# Patient Record
Sex: MALE | Race: WHITE | NOT HISPANIC OR LATINO | Employment: FULL TIME | URBAN - METROPOLITAN AREA
[De-identification: names, ages, dates, MRNs, and addresses within clinical notes are randomized per-mention and may not be internally consistent; named-entity substitution may affect disease eponyms.]

---

## 2017-01-15 ENCOUNTER — COMMUNICATION - HEALTHEAST (OUTPATIENT)
Dept: FAMILY MEDICINE | Facility: CLINIC | Age: 22
End: 2017-01-15

## 2017-01-15 DIAGNOSIS — F41.9 ANXIETY: ICD-10-CM

## 2018-05-11 ENCOUNTER — OFFICE VISIT - HEALTHEAST (OUTPATIENT)
Dept: FAMILY MEDICINE | Facility: CLINIC | Age: 23
End: 2018-05-11

## 2018-05-11 DIAGNOSIS — F41.9 ANXIETY: ICD-10-CM

## 2018-05-11 DIAGNOSIS — R03.0 ELEVATED BLOOD PRESSURE READING WITHOUT DIAGNOSIS OF HYPERTENSION: ICD-10-CM

## 2018-05-11 LAB
25(OH)D3 SERPL-MCNC: 20.5 NG/ML (ref 30–80)
25(OH)D3 SERPL-MCNC: 20.5 NG/ML (ref 30–80)
ALBUMIN SERPL-MCNC: 3.8 G/DL (ref 3.5–5)
ALBUMIN UR-MCNC: NEGATIVE MG/DL
ALP SERPL-CCNC: 105 U/L (ref 45–120)
ALT SERPL W P-5'-P-CCNC: 29 U/L (ref 0–45)
ANION GAP SERPL CALCULATED.3IONS-SCNC: 8 MMOL/L (ref 5–18)
APPEARANCE UR: CLEAR
AST SERPL W P-5'-P-CCNC: 23 U/L (ref 0–40)
BILIRUB SERPL-MCNC: 0.5 MG/DL (ref 0–1)
BILIRUB UR QL STRIP: NEGATIVE
BUN SERPL-MCNC: 15 MG/DL (ref 8–22)
CALCIUM SERPL-MCNC: 9.9 MG/DL (ref 8.5–10.5)
CHLORIDE BLD-SCNC: 106 MMOL/L (ref 98–107)
CO2 SERPL-SCNC: 26 MMOL/L (ref 22–31)
COLOR UR AUTO: YELLOW
CREAT SERPL-MCNC: 0.98 MG/DL (ref 0.7–1.3)
ERYTHROCYTE [DISTWIDTH] IN BLOOD BY AUTOMATED COUNT: 12.5 % (ref 11–14.5)
GFR SERPL CREATININE-BSD FRML MDRD: >60 ML/MIN/1.73M2
GLUCOSE BLD-MCNC: 89 MG/DL (ref 70–125)
GLUCOSE UR STRIP-MCNC: NEGATIVE MG/DL
HCT VFR BLD AUTO: 46.1 % (ref 40–54)
HGB BLD-MCNC: 15.8 G/DL (ref 14–18)
HGB UR QL STRIP: NEGATIVE
KETONES UR STRIP-MCNC: NEGATIVE MG/DL
LEUKOCYTE ESTERASE UR QL STRIP: NEGATIVE
MCH RBC QN AUTO: 29.9 PG (ref 27–34)
MCHC RBC AUTO-ENTMCNC: 34.3 G/DL (ref 32–36)
MCV RBC AUTO: 87 FL (ref 80–100)
NITRATE UR QL: NEGATIVE
PH UR STRIP: 6.5 [PH] (ref 5–8)
PLATELET # BLD AUTO: 276 THOU/UL (ref 140–440)
PMV BLD AUTO: 7.3 FL (ref 7–10)
POTASSIUM BLD-SCNC: 4.7 MMOL/L (ref 3.5–5)
PROT SERPL-MCNC: 7.2 G/DL (ref 6–8)
RBC # BLD AUTO: 5.29 MILL/UL (ref 4.4–6.2)
SODIUM SERPL-SCNC: 140 MMOL/L (ref 136–145)
SP GR UR STRIP: 1.02 (ref 1–1.03)
TSH SERPL DL<=0.005 MIU/L-ACNC: 4.91 UIU/ML (ref 0.3–5)
UROBILINOGEN UR STRIP-ACNC: NORMAL
WBC: 6.3 THOU/UL (ref 4–11)

## 2018-09-21 ENCOUNTER — OFFICE VISIT - HEALTHEAST (OUTPATIENT)
Dept: FAMILY MEDICINE | Facility: CLINIC | Age: 23
End: 2018-09-21

## 2018-09-21 DIAGNOSIS — R41.840 DIFFICULTY CONCENTRATING: ICD-10-CM

## 2018-09-21 ASSESSMENT — MIFFLIN-ST. JEOR: SCORE: 2107.41

## 2018-09-27 ENCOUNTER — COMMUNICATION - HEALTHEAST (OUTPATIENT)
Dept: FAMILY MEDICINE | Facility: CLINIC | Age: 23
End: 2018-09-27

## 2019-03-29 ENCOUNTER — OFFICE VISIT - HEALTHEAST (OUTPATIENT)
Dept: FAMILY MEDICINE | Facility: CLINIC | Age: 24
End: 2019-03-29

## 2019-03-29 DIAGNOSIS — D17.30 LIPOMA OF SKIN AND SUBCUTANEOUS TISSUE: ICD-10-CM

## 2019-03-29 ASSESSMENT — MIFFLIN-ST. JEOR: SCORE: 2109.35

## 2019-05-07 ENCOUNTER — OFFICE VISIT - HEALTHEAST (OUTPATIENT)
Dept: FAMILY MEDICINE | Facility: CLINIC | Age: 24
End: 2019-05-07

## 2019-05-07 DIAGNOSIS — F90.0 ATTENTION DEFICIT HYPERACTIVITY DISORDER (ADHD), PREDOMINANTLY INATTENTIVE TYPE: ICD-10-CM

## 2019-05-07 DIAGNOSIS — F41.1 GENERALIZED ANXIETY DISORDER: ICD-10-CM

## 2019-05-28 ENCOUNTER — COMMUNICATION - HEALTHEAST (OUTPATIENT)
Dept: FAMILY MEDICINE | Facility: CLINIC | Age: 24
End: 2019-05-28

## 2019-05-28 DIAGNOSIS — F41.9 ANXIETY: ICD-10-CM

## 2019-09-08 ENCOUNTER — OFFICE VISIT - HEALTHEAST (OUTPATIENT)
Dept: FAMILY MEDICINE | Facility: CLINIC | Age: 24
End: 2019-09-08

## 2019-09-08 DIAGNOSIS — F41.9 ANXIETY: ICD-10-CM

## 2019-09-08 ASSESSMENT — ANXIETY QUESTIONNAIRES
GAD7 TOTAL SCORE: 21
1. FEELING NERVOUS, ANXIOUS, OR ON EDGE: NEARLY EVERY DAY
IF YOU CHECKED OFF ANY PROBLEMS ON THIS QUESTIONNAIRE, HOW DIFFICULT HAVE THESE PROBLEMS MADE IT FOR YOU TO DO YOUR WORK, TAKE CARE OF THINGS AT HOME, OR GET ALONG WITH OTHER PEOPLE: NOT DIFFICULT AT ALL
7. FEELING AFRAID AS IF SOMETHING AWFUL MIGHT HAPPEN: NEARLY EVERY DAY
2. NOT BEING ABLE TO STOP OR CONTROL WORRYING: NEARLY EVERY DAY
5. BEING SO RESTLESS THAT IT IS HARD TO SIT STILL: NEARLY EVERY DAY
4. TROUBLE RELAXING: NEARLY EVERY DAY
3. WORRYING TOO MUCH ABOUT DIFFERENT THINGS: NEARLY EVERY DAY
6. BECOMING EASILY ANNOYED OR IRRITABLE: NEARLY EVERY DAY

## 2019-09-08 ASSESSMENT — PATIENT HEALTH QUESTIONNAIRE - PHQ9: SUM OF ALL RESPONSES TO PHQ QUESTIONS 1-9: 23

## 2019-09-09 ENCOUNTER — OFFICE VISIT - HEALTHEAST (OUTPATIENT)
Dept: FAMILY MEDICINE | Facility: CLINIC | Age: 24
End: 2019-09-09

## 2019-09-09 DIAGNOSIS — F41.1 GENERALIZED ANXIETY DISORDER: ICD-10-CM

## 2019-09-09 ASSESSMENT — ANXIETY QUESTIONNAIRES
GAD7 TOTAL SCORE: 18
IF YOU CHECKED OFF ANY PROBLEMS ON THIS QUESTIONNAIRE, HOW DIFFICULT HAVE THESE PROBLEMS MADE IT FOR YOU TO DO YOUR WORK, TAKE CARE OF THINGS AT HOME, OR GET ALONG WITH OTHER PEOPLE: VERY DIFFICULT
2. NOT BEING ABLE TO STOP OR CONTROL WORRYING: NEARLY EVERY DAY
5. BEING SO RESTLESS THAT IT IS HARD TO SIT STILL: NEARLY EVERY DAY
7. FEELING AFRAID AS IF SOMETHING AWFUL MIGHT HAPPEN: NEARLY EVERY DAY
4. TROUBLE RELAXING: NEARLY EVERY DAY
3. WORRYING TOO MUCH ABOUT DIFFERENT THINGS: NEARLY EVERY DAY
6. BECOMING EASILY ANNOYED OR IRRITABLE: NOT AT ALL
1. FEELING NERVOUS, ANXIOUS, OR ON EDGE: NEARLY EVERY DAY

## 2019-09-09 ASSESSMENT — PATIENT HEALTH QUESTIONNAIRE - PHQ9: SUM OF ALL RESPONSES TO PHQ QUESTIONS 1-9: 15

## 2019-09-26 ENCOUNTER — OFFICE VISIT - HEALTHEAST (OUTPATIENT)
Dept: FAMILY MEDICINE | Facility: CLINIC | Age: 24
End: 2019-09-26

## 2019-09-26 DIAGNOSIS — F41.9 ANXIETY: ICD-10-CM

## 2019-09-26 DIAGNOSIS — Z23 NEED FOR INFLUENZA VACCINATION: ICD-10-CM

## 2019-09-26 ASSESSMENT — PATIENT HEALTH QUESTIONNAIRE - PHQ9: SUM OF ALL RESPONSES TO PHQ QUESTIONS 1-9: 9

## 2019-10-09 ENCOUNTER — COMMUNICATION - HEALTHEAST (OUTPATIENT)
Dept: SCHEDULING | Facility: CLINIC | Age: 24
End: 2019-10-09

## 2019-10-24 ENCOUNTER — OFFICE VISIT - HEALTHEAST (OUTPATIENT)
Dept: FAMILY MEDICINE | Facility: CLINIC | Age: 24
End: 2019-10-24

## 2019-10-24 DIAGNOSIS — F41.1 GENERALIZED ANXIETY DISORDER: ICD-10-CM

## 2019-10-24 DIAGNOSIS — Z13.1 SCREENING FOR DIABETES MELLITUS: ICD-10-CM

## 2019-10-24 DIAGNOSIS — Z00.00 ROUTINE MEDICAL EXAM: ICD-10-CM

## 2019-10-24 DIAGNOSIS — Z13.220 SCREENING, LIPID: ICD-10-CM

## 2019-10-24 LAB
ANION GAP SERPL CALCULATED.3IONS-SCNC: 9 MMOL/L (ref 5–18)
BUN SERPL-MCNC: 12 MG/DL (ref 8–22)
CALCIUM SERPL-MCNC: 9.8 MG/DL (ref 8.5–10.5)
CHLORIDE BLD-SCNC: 106 MMOL/L (ref 98–107)
CHOLEST SERPL-MCNC: 235 MG/DL
CO2 SERPL-SCNC: 26 MMOL/L (ref 22–31)
CREAT SERPL-MCNC: 0.95 MG/DL (ref 0.7–1.3)
FASTING STATUS PATIENT QL REPORTED: YES
GFR SERPL CREATININE-BSD FRML MDRD: >60 ML/MIN/1.73M2
GLUCOSE BLD-MCNC: 89 MG/DL (ref 70–125)
HDLC SERPL-MCNC: 46 MG/DL
LDLC SERPL CALC-MCNC: 171 MG/DL
POTASSIUM BLD-SCNC: 4.5 MMOL/L (ref 3.5–5)
SODIUM SERPL-SCNC: 141 MMOL/L (ref 136–145)
TRIGL SERPL-MCNC: 92 MG/DL

## 2019-10-24 ASSESSMENT — ANXIETY QUESTIONNAIRES
7. FEELING AFRAID AS IF SOMETHING AWFUL MIGHT HAPPEN: NOT AT ALL
2. NOT BEING ABLE TO STOP OR CONTROL WORRYING: NOT AT ALL
6. BECOMING EASILY ANNOYED OR IRRITABLE: SEVERAL DAYS
GAD7 TOTAL SCORE: 3
3. WORRYING TOO MUCH ABOUT DIFFERENT THINGS: SEVERAL DAYS
1. FEELING NERVOUS, ANXIOUS, OR ON EDGE: SEVERAL DAYS
4. TROUBLE RELAXING: NOT AT ALL
IF YOU CHECKED OFF ANY PROBLEMS ON THIS QUESTIONNAIRE, HOW DIFFICULT HAVE THESE PROBLEMS MADE IT FOR YOU TO DO YOUR WORK, TAKE CARE OF THINGS AT HOME, OR GET ALONG WITH OTHER PEOPLE: NOT DIFFICULT AT ALL
5. BEING SO RESTLESS THAT IT IS HARD TO SIT STILL: NOT AT ALL

## 2019-10-24 ASSESSMENT — PATIENT HEALTH QUESTIONNAIRE - PHQ9: SUM OF ALL RESPONSES TO PHQ QUESTIONS 1-9: 6

## 2019-10-24 ASSESSMENT — MIFFLIN-ST. JEOR: SCORE: 2149.26

## 2020-03-19 ENCOUNTER — COMMUNICATION - HEALTHEAST (OUTPATIENT)
Dept: SCHEDULING | Facility: CLINIC | Age: 25
End: 2020-03-19

## 2020-04-08 ENCOUNTER — VIRTUAL VISIT (OUTPATIENT)
Dept: FAMILY MEDICINE | Facility: OTHER | Age: 25
End: 2020-04-08

## 2020-04-08 NOTE — PROGRESS NOTES
"Date: 2020 22:24:03  Clinician: Mile Ohara  Clinician NPI: [id number]  Patient: Brian Bright  Patient : 1995  Patient Address: [address], [place] [address]  Patient Phone: [phone]  Visit Protocol: URI  Patient Summary:  Brian is a 24 year old ( : 1995 ) male who initiated a Visit for COVID-19 (Coronavirus) evaluation and screening. When asked the question \"Please sign me up to receive news, health information and promotions from [employer].\", Brian responded \"No\".    Brian states his symptoms started gradually 10-13 days ago. After his symptoms started, they improved and then got worse again.   His symptoms consist of rhinitis, facial pain or pressure, myalgia, a cough, malaise, and a headache. He is experiencing mild difficulty breathing with activities but can speak normally in full sentences. Brian also feels feverish.   Symptom details     Nasal secretions: The color of his mucus is clear.    Cough: Brian coughs every 5-10 minutes and his cough is not more bothersome at night. Phlegm does not come into his throat when he coughs. He does not believe his cough is caused by post-nasal drip.     Temperature: His current temperature is 99.6 degrees Fahrenheit.     Facial pain or pressure: The facial pain or pressure does not feel worse when bending or leaning forward.     Headache: He states the headache is moderate (4-6 on a 10 point pain scale).      Brian denies having sore throat, nasal congestion, ear pain, enlarged lymph nodes, chills, diarrhea, vomiting, nausea, teeth pain, and wheezing. He also denies taking antibiotic medication for the symptoms, having recent facial or sinus surgery in the past 60 days, and having a sinus infection within the past year.   Precipitating events  He has not recently been exposed to someone with influenza. Brian has not been in close contact with any high risk individuals.   Pertinent COVID-19 (Coronavirus) information  Brian has not traveled " internationally or to the areas where COVID-19 (Coronavirus) is widespread, including cruise ship travel in the last 14 days before the start of his symptoms.   Brian has not had a close contact with a laboratory-confirmed COVID-19 patient within 14 days of symptom onset. He also has not had a close contact with a suspected COVID-19 patient within 14 days of symptom onset.   Brian does not work or volunteer as healthcare worker or a  and does not work or volunteer in a healthcare facility. He does not live with a healthcare worker.   Pertinent medical history  Brian needs a return to work/school note.   Weight: 240 lbs   Brian does not smoke or use smokeless tobacco.   Weight: 240 lbs    MEDICATIONS: Lexapro oral, ALLERGIES: NKDA  Clinician Response:  Dear Brian,  Based on the information provided, you have acute bacterial sinusitis, also known as a sinus infection. Sinus infections are caused by bacteria or a virus and symptoms are almost always identical. The difference between the 2 types of infections is timing.  Sinus infections start as viral infections and symptoms improve on their own in about 7 days. If symptoms have not improved after 7 days or have even worsened, a bacterial infection may have developed.  Medication information  I am prescribing:     Amoxicillin 500 mg oral tablet. Take 1 tablet by mouth every 8 hours for 10 days. There are no refills with this prescription.   Unless you are allergic to the over-the-counter medication(s) below, I recommend using:     Ibuprofen (Advil or store brand) 200 mg oral tablet. Take 1-3 tablets (200-600 mg) by mouth every 8 hours to help with the discomfort. Make sure to take the ibuprofen with food. Do not exceed 2400 mg in 24 hours.   Over-the-counter medications do not require a prescription. Ask the pharmacist if you have any questions.  Self care  Steps you can take to be as comfortable as possible:     Rest.    Drink plenty of fluids.    Take a  warm shower to loosen congestion    Use a cool-mist humidifier.    Take a spoonful of honey to reduce your cough.     When to seek care  Please be seen in a clinic or urgent care if any of the following occur:     New symptoms develop, or symptoms become worse    Symptoms do not start to improve after 3 days of treatment     Call ahead before going to the clinic or urgent care.  It is possible to have an allergic reaction to an antibiotic even if you have not had one in the past. If you notice a new rash, significant swelling, or difficulty breathing, stop taking this medication immediately and go to a clinic or urgent care.  COVID-19 (Coronavirus) General Information  With the increase in the number of COVID-19 (Coronavirus) cases, we understand you may have some questions. Below is some helpful information on COVID-19 (Coronavirus).  How can I protect myself and others from the COVID-19 (Coronavirus)?  Because there is currently no vaccine to prevent infection, the best way to protect yourself is to avoid being exposed to this virus. Put distance between yourself and other people if COVID-19 (Coronavirus) is spreading in your community. The virus is thought to spread mainly from person-to-person.     Between people who are in close contact with one another (within about 6 about) for a prolonged period (10 minutes or longer).    Through respiratory droplets produced when an infected person coughs or sneezes.     The CDC recommends the following additional steps to protect yourself and others:     Wash your hands often with soap and water for at least 20 seconds, especially after blowing your nose, coughing, or sneezing; going to the bathroom; and before eating or preparing food.  Use an alcohol-based hand  that contains at least 60 percent alcohol if soap and water are not available.        Avoid touching your eyes, nose and mouth with unwashed hands.    Avoid close contact with people who are sick.     Stay home when you are sick.    Cover your cough or sneeze with a tissue, then throw the tissue in the trash.    Clean and disinfect frequently touched objects and surfaces.     You can help stop COVID-19 (Coronavirus) by knowing the signs and symptoms:     Fever    Cough    Shortness of breath     Contact your healthcare provider if   Develop symptoms   AND   Have been in close contact with a person known to have COVID-19 (Coronavirus) or live in or have recently traveled from an area with ongoing spread of COVID-19 (Coronavirus). Call ahead before you go to a doctor's office or emergency room. Tell them about your recent travel and your symptoms.   For the most up to date information, visit the CDC's website.  Self-monitoring  Self-monitoring means people should monitor themselves for fever by taking their temperatures twice a day and remain alert for a cough or difficulty breathing.  It is important to check your health two times each day for 14 days after a potential exposure to a person with COVID-19 (Coronavirus) or after travel from a location where COVID-19 (Coronavirus) is widespread. If you have been exposed to a person with COVID-19 (Coronavirus), it may take up to 14 days to know if you will get sick. Follow the steps below to check and record your health.     Take your temperature with a thermometer twice a day, once in the morning and once in the evening, and watch for a cough or difficulty breathing for 14 days.    Write down your temperature and any COVID-19 symptoms you may have: feeling feverish, coughing, or difficulty breathing.    Stay home from work or school.    Do not take public transportation, taxis, or ride-shares.    Avoid crowded places (such as shopping centers and movie theaters) and limit your activities in public.    Keep your distance from others (about 6 feet or 2 meters).    If you get sick with fever, cough, or trouble breathing, contact your healthcare provider and tell them  "about your recent travel and/or your symptoms.    If you need to seek medical care for other reasons, such as dialysis, call ahead to your doctor and tell them about your recent travel.     Steps to help prevent the spread of COVID-19 (Coronavirus) if you are sick  If you are sick with COVID-19 (Coronavirus) or suspect you are infected with the virus that causes COVID-19 (Coronavirus), follow the steps below to help prevent the disease from spreading&nbsp;to people in your home and community.     Stay home except to get medical care. Home isolation may be started in consultation with your healthcare clinician.    Separate yourself from other people and animals in your home.    Call ahead before visiting your doctor if you have a medical appointment.    Wear a facemask when you are around other people.    Cover your cough and sneezes.    Clean your hands often.    Avoid sharing personal household items.    Clean and disinfect frequently touched objects and surfaces everyday.    You will need to have someone drop off medications or household supplies (if needed) at your house without coming inside or in contact with you or others living in your house.    Monitor your symptoms and seek prompt medical care if your illness is worsening (e.g. Difficulty breathing).    Discontinue home isolation only in consultation with your healthcare provider.     For more detailed and up to date information on what to do if you are sick, visit this link: What to Do If You Are Sick With COVID-19.  Do I need to be tested for COVID-19 (Coronavirus)?     Not everyone needs to be tested for COVID-19 (Coronavirus). Decisions on which patients receive testing will be based on the local spread of COVID-19 (Coronavirus) as well as the symptoms. Your healthcare provider will make the final decision on whether you should be tested.    In the meantime, if you have concerns that you may have been exposed, it is reasonable to practice \"social " "distancing.\"&nbsp; If you are ill with a cold or flu-like illness, please monitor your symptoms and call your healthcare provider if your symptoms worsen.    For more up to date information, visit this link: COVID-19 (Coronavirus) Frequently Asked Questions and Answers.      Diagnosis: Acute bacterial sinusitis  Diagnosis ICD: J01.90  Prescription: amoxicillin 500 mg oral tablet 30 tablet, 10 days supply. Take 1 tablet by mouth every 8 hours for 10 days. Refills: 0, Refill as needed: no, Allow substitutions: yes  "

## 2020-04-10 ENCOUNTER — OFFICE VISIT - HEALTHEAST (OUTPATIENT)
Dept: FAMILY MEDICINE | Facility: CLINIC | Age: 25
End: 2020-04-10

## 2020-04-10 DIAGNOSIS — Z20.822 SUSPECTED 2019 NOVEL CORONAVIRUS INFECTION: ICD-10-CM

## 2020-04-18 ENCOUNTER — COMMUNICATION - HEALTHEAST (OUTPATIENT)
Dept: SCHEDULING | Facility: CLINIC | Age: 25
End: 2020-04-18

## 2020-04-26 ENCOUNTER — COMMUNICATION - HEALTHEAST (OUTPATIENT)
Dept: SCHEDULING | Facility: CLINIC | Age: 25
End: 2020-04-26

## 2020-04-30 ENCOUNTER — OFFICE VISIT - HEALTHEAST (OUTPATIENT)
Dept: FAMILY MEDICINE | Facility: CLINIC | Age: 25
End: 2020-04-30

## 2020-04-30 DIAGNOSIS — L03.115 CELLULITIS OF RIGHT LOWER EXTREMITY: ICD-10-CM

## 2020-04-30 DIAGNOSIS — T14.8XXA OPEN WOUND: ICD-10-CM

## 2020-04-30 DIAGNOSIS — F41.1 GAD (GENERALIZED ANXIETY DISORDER): ICD-10-CM

## 2020-04-30 ASSESSMENT — MIFFLIN-ST. JEOR: SCORE: 2162.87

## 2020-08-31 ENCOUNTER — COMMUNICATION - HEALTHEAST (OUTPATIENT)
Dept: FAMILY MEDICINE | Facility: CLINIC | Age: 25
End: 2020-08-31

## 2020-08-31 DIAGNOSIS — Z20.822 EXPOSURE TO 2019 NOVEL CORONAVIRUS: ICD-10-CM

## 2020-09-03 ENCOUNTER — AMBULATORY - HEALTHEAST (OUTPATIENT)
Dept: LAB | Facility: CLINIC | Age: 25
End: 2020-09-03

## 2020-09-03 DIAGNOSIS — Z20.822 EXPOSURE TO 2019 NOVEL CORONAVIRUS: ICD-10-CM

## 2020-09-05 ENCOUNTER — COMMUNICATION - HEALTHEAST (OUTPATIENT)
Dept: SCHEDULING | Facility: CLINIC | Age: 25
End: 2020-09-05

## 2020-10-01 ENCOUNTER — COMMUNICATION - HEALTHEAST (OUTPATIENT)
Dept: FAMILY MEDICINE | Facility: CLINIC | Age: 25
End: 2020-10-01

## 2020-10-01 DIAGNOSIS — F41.1 GENERALIZED ANXIETY DISORDER: ICD-10-CM

## 2020-10-02 RX ORDER — ESCITALOPRAM OXALATE 20 MG/1
TABLET ORAL
Qty: 90 TABLET | Refills: 3 | Status: SHIPPED | OUTPATIENT
Start: 2020-10-02 | End: 2021-10-24

## 2020-10-19 ENCOUNTER — COMMUNICATION - HEALTHEAST (OUTPATIENT)
Dept: FAMILY MEDICINE | Facility: CLINIC | Age: 25
End: 2020-10-19

## 2020-10-19 ENCOUNTER — OFFICE VISIT - HEALTHEAST (OUTPATIENT)
Dept: FAMILY MEDICINE | Facility: CLINIC | Age: 25
End: 2020-10-19

## 2020-10-19 DIAGNOSIS — M79.10 MYALGIA: ICD-10-CM

## 2020-10-19 DIAGNOSIS — R53.83 FATIGUE, UNSPECIFIED TYPE: ICD-10-CM

## 2020-10-19 DIAGNOSIS — R63.0 DECREASED APPETITE: ICD-10-CM

## 2020-10-19 DIAGNOSIS — R43.9 DECREASED TASTE AND SMELL: ICD-10-CM

## 2020-10-20 ENCOUNTER — AMBULATORY - HEALTHEAST (OUTPATIENT)
Dept: FAMILY MEDICINE | Facility: CLINIC | Age: 25
End: 2020-10-20

## 2020-10-20 DIAGNOSIS — R43.9 DECREASED TASTE AND SMELL: ICD-10-CM

## 2020-10-20 DIAGNOSIS — M79.10 MYALGIA: ICD-10-CM

## 2020-10-20 DIAGNOSIS — R63.0 DECREASED APPETITE: ICD-10-CM

## 2020-10-20 DIAGNOSIS — R53.83 FATIGUE, UNSPECIFIED TYPE: ICD-10-CM

## 2020-10-22 ENCOUNTER — COMMUNICATION - HEALTHEAST (OUTPATIENT)
Dept: SCHEDULING | Facility: CLINIC | Age: 25
End: 2020-10-22

## 2020-11-07 ENCOUNTER — COMMUNICATION - HEALTHEAST (OUTPATIENT)
Dept: FAMILY MEDICINE | Facility: CLINIC | Age: 25
End: 2020-11-07

## 2020-11-07 DIAGNOSIS — Z20.822 EXPOSURE TO 2019 NOVEL CORONAVIRUS: ICD-10-CM

## 2020-12-17 ENCOUNTER — OFFICE VISIT - HEALTHEAST (OUTPATIENT)
Dept: FAMILY MEDICINE | Facility: CLINIC | Age: 25
End: 2020-12-17

## 2020-12-17 DIAGNOSIS — F98.8 ATTENTION DEFICIT DISORDER (ADD) WITHOUT HYPERACTIVITY: ICD-10-CM

## 2020-12-17 RX ORDER — ATOMOXETINE 25 MG/1
25 CAPSULE ORAL DAILY
Qty: 30 CAPSULE | Refills: 2 | Status: SHIPPED | OUTPATIENT
Start: 2020-12-17 | End: 2021-10-27

## 2020-12-17 RX ORDER — ATOMOXETINE 10 MG/1
10 CAPSULE ORAL DAILY
Qty: 7 CAPSULE | Refills: 0 | Status: SHIPPED | OUTPATIENT
Start: 2020-12-17 | End: 2021-10-27

## 2020-12-17 RX ORDER — ATOMOXETINE 18 MG/1
18 CAPSULE ORAL DAILY
Qty: 7 CAPSULE | Refills: 0 | Status: SHIPPED | OUTPATIENT
Start: 2020-12-17 | End: 2021-10-27

## 2021-05-26 ENCOUNTER — RECORDS - HEALTHEAST (OUTPATIENT)
Dept: ADMINISTRATIVE | Facility: CLINIC | Age: 26
End: 2021-05-26

## 2021-05-26 ASSESSMENT — PATIENT HEALTH QUESTIONNAIRE - PHQ9
SUM OF ALL RESPONSES TO PHQ QUESTIONS 1-9: 9
SUM OF ALL RESPONSES TO PHQ QUESTIONS 1-9: 6
SUM OF ALL RESPONSES TO PHQ QUESTIONS 1-9: 15
SUM OF ALL RESPONSES TO PHQ QUESTIONS 1-9: 23

## 2021-05-27 NOTE — PROGRESS NOTES
1. Lipoma of skin and subcutaneous tissue         Plan: Reassurance that this is a benign finding and nothing that needs to worry about.  I would encourage him not to manipulate it or mess with it too much as that might inflamed a bit.  He can check on it once every so often and let him larger, more cosmetically unappealing, or functional in that it hurts when he uses his leg, etc.  Otherwise it will likely remain as it is now.    Subjective: 23-year-old male is here today for a lump that he has in his left anterior thigh.  He is only noticed it over the last several months.  He has been manipulating it a bit trying to figure it out and move it around.  He does not think that it is causing him any pain.  It does not seem to hurt him when he moves his leg.  It does not hurt to walk on.  It is not discolored or warm.    Objective: Well-appearing male in no acute distress.  Vital signs as noted.  He has a very typical lipoma that is proximal to the patella on that left thigh.  It is small freely mobile and soft and not concerning at all.

## 2021-05-28 ASSESSMENT — ANXIETY QUESTIONNAIRES
GAD7 TOTAL SCORE: 18
GAD7 TOTAL SCORE: 21
GAD7 TOTAL SCORE: 3

## 2021-05-28 NOTE — PROGRESS NOTES
1. Generalized anxiety disorder  escitalopram oxalate (LEXAPRO) 10 MG tablet   2. Attention deficit hyperactivity disorder (ADHD), predominantly inattentive type  dextroamphetamine-amphetamine (ADDERALL XR) 15 MG 24 hr capsule      Plan: Did refill his escitalopram from 10 mg a day.  I told him that if it is too much of a hassle to try to get [facility] to prescribe this I am happy to do it for him as he is doing very well on it has no problems or complications with it.  We can continue this indefinitely and discussed that probably about twice year to make sure that he still doing well on it.    He has in his history, at times, gone back and forth on Adderall for ADHD.  He has been off of it for a few years now.  He is wondering if he can get back on it for a while.  He is having a bit of a harder time with his job as he is gotten into a management position and he has had a little bit more of a tough time focusing.  Whenever he has used in the past visit and a nice job for him in helping him to focus and perform better at his job.  I looked back in the records and confirm the Dr. Fink and he had discussed this on numerous occasions in the past and he is usually on it for a year or 2 and then goes off of it again.  He has tolerated Adderall in the past and has been on anywhere from 15 to 25 mg a day.    He can check with him then again in a month and have him come back and discuss how he is doing on this and we will go forward up with the 15 mg a day dose.    15 minutes total time spent together with the patient today, more than 50% of that time spent in counseling, discussing the above topics.       Subjective: 23-year-old male who is here today for medication check.  Would put him on S-Citalopram a while ago and in fact he has been getting it from his mental health provider and he is wondering if I can take over prescribing it.  It is done very well for him and he gets benefit from it without any  significant side effects or problems with it.  Is been on it for quite some time now.    Also today just to discuss a bit of trouble with ADHD.  As noted in the note above in the plan section, he has been on it back and forth a few times over his adult life.  He has done well when he has been on it and helps him focus at work and do better job.    Objective: Well-appearing male in no acute distress.  Vital signs as noted.  Chest clear to auscultation.  Heart regular rate and rhythm.  Neurologically intact.  He had his mental state seems appropriate he is alert and interactive today

## 2021-05-29 ENCOUNTER — HEALTH MAINTENANCE LETTER (OUTPATIENT)
Age: 26
End: 2021-05-29

## 2021-06-01 VITALS — BODY MASS INDEX: 35.6 KG/M2 | WEIGHT: 269.8 LBS

## 2021-06-01 NOTE — PROGRESS NOTES
Chief Complaint   Patient presents with     Anxiety     worse in past 3 days         HPI      Patient is here for 1-2 wks of gradually worsening anxiety symptoms, worst the last 3 days. He takes Lexapro for anxiety. He reported feeling minimally depressed but denied SI/HI. His sleep, concentration, and focus have been poor. No fever, chills, chest pain, shortness of breath.    ROS: Pertinent ROS noted in HPI.     No Known Allergies    Patient Active Problem List   Diagnosis     Allergies     Palpitations     Asperger's Disorder     ADHD, Predominantly Inattentive Type     Ingrowing toenail       Family History   Problem Relation Age of Onset     Cancer Maternal Grandmother      Cancer Maternal Grandfather      Cancer Paternal Grandmother      Cancer Paternal Grandfather        Social History     Socioeconomic History     Marital status: Single     Spouse name: Not on file     Number of children: Not on file     Years of education: Not on file     Highest education level: Not on file   Occupational History     Not on file   Social Needs     Financial resource strain: Not on file     Food insecurity:     Worry: Not on file     Inability: Not on file     Transportation needs:     Medical: Not on file     Non-medical: Not on file   Tobacco Use     Smoking status: Never Smoker     Smokeless tobacco: Never Used   Substance and Sexual Activity     Alcohol use: No     Drug use: No     Comment: Previous use of psychedelics     Sexual activity: Not on file   Lifestyle     Physical activity:     Days per week: Not on file     Minutes per session: Not on file     Stress: Not on file   Relationships     Social connections:     Talks on phone: Not on file     Gets together: Not on file     Attends Anabaptist service: Not on file     Active member of club or organization: Not on file     Attends meetings of clubs or organizations: Not on file     Relationship status: Not on file     Intimate partner violence:     Fear of current or  ex partner: Not on file     Emotionally abused: Not on file     Physically abused: Not on file     Forced sexual activity: Not on file   Other Topics Concern     Not on file   Social History Narrative     Not on file         Objective:    Vitals:    09/08/19 1604   BP: 132/78   Pulse: 88   Temp: 98.4  F (36.9  C)   SpO2: 97%       Gen: well appearing  CV: RRR, no M,R ,G   Pulm: CTAB, normal effort  Psych: normal affect, good eye contacts.      MIESHA score = 21  PHQ-9 score  23        Anxiety  -     LORazepam (ATIVAN) 0.5 MG tablet; Take 1 tablet (0.5 mg total) by mouth every 8 (eight) hours as needed for anxiety.      Continue Lexapro. Advised f/u with PCP in a few days.

## 2021-06-01 NOTE — PROGRESS NOTES
ASSESSMENT:  1. Anxiety    I did give him a few more the lorazepam to get into the next couple of weeks.  We discussed that the Lexapro may be taking up to 6 to 8 weeks to get to its full effectiveness at this dose.  He will continue with the 20 mg tablet as we have been on for about 3 weeks.  We can follow-up with him at that time if he does not seem to be getting better and I would consider adding something to it at that point.  We did discuss at length that the Lorazepam is not a long-term medication and would be hopeful that this will be his last prescription for that medication.    - LORazepam (ATIVAN) 0.5 MG tablet; Take 1 tablet (0.5 mg total) by mouth every 8 (eight) hours as needed for anxiety.  Dispense: 15 tablet; Refill: 0    2. Need for influenza vaccination    - Influenza,Seasonal,Quad,INJ =/>6months          PLAN:  There are no Patient Instructions on file for this visit.    Orders Placed This Encounter   Procedures     Influenza,Seasonal,Quad,INJ =/>6months     Medications Discontinued During This Encounter   Medication Reason     LORazepam (ATIVAN) 0.5 MG tablet Reorder       No follow-ups on file.    CHIEF COMPLAINT:  Chief Complaint   Patient presents with     Anxiety     Discuss med effectiveness       HISTORY OF PRESENT ILLNESS:  Federico is a 23 y.o. male presenting to the clinic today for recheck of his anxiety.  Please see the last note where he increased his Lexapro to 20 mg a day.  He states that it is starting to get a bit better but wonders if he can go up again on it.  We discussed at length today that it takes the medication a bit for it to get to at steady state and its effectiveness and I do not often go over 20 mg of this particular medication.  He uses the Lorazepam as I told him to which is only for severe episodes of panic or anxiety but he would like to have another refill of that while he is waiting for the medication to kick in to its full potency.    REVIEW OF SYSTEMS:     All  other systems are negative.    PFSH:      TOBACCO USE:  Social History     Tobacco Use   Smoking Status Never Smoker   Smokeless Tobacco Never Used       VITALS:  Vitals:    09/26/19 0819   BP: 94/70   Patient Site: Left Arm   Patient Position: Sitting   Cuff Size: Adult Large   Pulse: 73   SpO2: 97%   Weight: (!) 249 lb (112.9 kg)     Wt Readings from Last 3 Encounters:   09/26/19 (!) 249 lb (112.9 kg)   09/09/19 (!) 249 lb (112.9 kg)   09/08/19 (!) 246 lb 3.2 oz (111.7 kg)     Body mass index is 32.85 kg/m .    PHYSICAL EXAM:  Constitutional:  Well appearing patient in no acute distress.  Vitals:  Per nursing notes.    HEENT:  Normocephalic, atraumatic.  Ears are clear bilaterally, with no fluid or redness, and landmarks visible.  Pupils are equal and reactive to light, extraocular muscles intact, visual fields are full.  Nose is normal, and oropharynx is clear without redness.    Neck is without lymphadenopathy.    Lungs:  Clear to auscultation bilaterally without wheezes, rales or rhonchi.   Cardiac:  Regular rate and rhythm without murmurs, rubs, or gallops.     Legs show no cyanosis, clubbing or edema.  Palpation of the distal pulses are normal and symmetric.    Neurologic:  Cranial nerves II-XII intact.   Normal and symmetric reflexes in extremities, with normal strength and sensation.  Psychiatric:  Mood appropriate, memory intact.       The visit lasted a total of 15 minutes face to face with the patient. Over 50% of the time was spent counseling and educating the patient about medications, medication adjustments, medication side effects, and lab testing.        MEDICATIONS:  Current Outpatient Medications   Medication Sig Dispense Refill     escitalopram oxalate (LEXAPRO) 20 MG tablet Take 1 tablet (20 mg total) by mouth daily. 90 tablet 3     LORazepam (ATIVAN) 0.5 MG tablet Take 1 tablet (0.5 mg total) by mouth every 8 (eight) hours as needed for anxiety. 15 tablet 0     No current facility-administered  medications for this visit.

## 2021-06-02 VITALS — HEIGHT: 73 IN | WEIGHT: 238.2 LBS | BODY MASS INDEX: 31.57 KG/M2

## 2021-06-02 VITALS — WEIGHT: 236.9 LBS | BODY MASS INDEX: 31.4 KG/M2 | HEIGHT: 73 IN

## 2021-06-02 NOTE — TELEPHONE ENCOUNTER
RN Triage:     Patient calling in stating that last night around 11pm he had a pulsating pain in the right side of his head that lasted 3 hours. He rated the pain a 4-5.  Patient woke up nauseated and had slight blurred vision without his glasses so he is unsure. No headache, blurred vision or nausea now. No history of migraines. He stated he has been having stress headaches for the last 2 weeks. A little stiff neck. NO fever or rash. Patient given home care suggestions and already has an appointment to be seen on 10/24/19.    Dalia Hernandes RN, BSN [facility] Triage Nurse      Reason for Disposition    Mild-moderate headache    Protocols used: HEADACHE-A-[place]

## 2021-06-03 VITALS
DIASTOLIC BLOOD PRESSURE: 70 MMHG | SYSTOLIC BLOOD PRESSURE: 104 MMHG | BODY MASS INDEX: 32.74 KG/M2 | OXYGEN SATURATION: 95 % | WEIGHT: 247 LBS | HEIGHT: 73 IN | HEART RATE: 79 BPM

## 2021-06-03 VITALS
OXYGEN SATURATION: 97 % | BODY MASS INDEX: 32.48 KG/M2 | HEART RATE: 88 BPM | WEIGHT: 246.2 LBS | TEMPERATURE: 98.4 F | DIASTOLIC BLOOD PRESSURE: 78 MMHG | SYSTOLIC BLOOD PRESSURE: 132 MMHG

## 2021-06-03 VITALS
WEIGHT: 249 LBS | DIASTOLIC BLOOD PRESSURE: 70 MMHG | SYSTOLIC BLOOD PRESSURE: 94 MMHG | OXYGEN SATURATION: 97 % | HEART RATE: 73 BPM | BODY MASS INDEX: 32.85 KG/M2

## 2021-06-03 VITALS
OXYGEN SATURATION: 97 % | HEART RATE: 92 BPM | DIASTOLIC BLOOD PRESSURE: 68 MMHG | BODY MASS INDEX: 32.85 KG/M2 | WEIGHT: 249 LBS | SYSTOLIC BLOOD PRESSURE: 108 MMHG

## 2021-06-03 VITALS — WEIGHT: 241.9 LBS | BODY MASS INDEX: 31.91 KG/M2

## 2021-06-04 VITALS — BODY MASS INDEX: 33.13 KG/M2 | WEIGHT: 250 LBS | HEIGHT: 73 IN

## 2021-06-07 NOTE — TELEPHONE ENCOUNTER
"Pt reports \"dry cough\" -> \"2-to-3 days ago\"  \"Occurred just a couple times.\"    Now resolved entirely.  No cough spells at any time.  No other symptoms whatsoever.  Pt feels well.    Negative travel screen.    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    Discussed best to stay away from work or any public areas for 72 hours from date of brief mild cough.  Pt has done so -> not slated to return to work until tomorrow -> this would be 4 days without symptoms which appears acceptable.  Advised to call back in the event of any additional symptoms whatsoever.  Pt verbalizes understanding.  Agrees to plan.    Wendy Steele RN  [facility] Triage     Reason for Disposition    Cough with no complications     NOT occurring now -> resolved 48 hours ago.    Protocols used: COUGH-A-[place]      "

## 2021-06-07 NOTE — TELEPHONE ENCOUNTER
Patient calling with Pain  And Swelling in the foot.  He does state he has some skin that peels there and it got deeper than usual 2 days ago.  Now it is painful to the touch with redness around the wound.  COVID 19 Nurse Triage Plan/Patient Instructions    Please be aware that novel coronavirus (COVID-19) may be circulating in the community. If you develop symptoms such as fever, cough, or SOB or if you have concerns about the presence of another infection including coronavirus (COVID-19), please contact your health care provider or visit [email or web address].     Disposition/Instructions    Patient to go to ED and follow protocol based instructions. Follow System Ambulatory Workflow for COVID 19.     Bring Your Own Device:  Please also bring your smart device(s) (smart phones, tablets, laptops) and their charging cables for your personal use and to communicate with your care team during your visit.      Thank you for limiting contact with others, wearing a simple mask to cover your cough, practice good hand hygiene habits and accessing our virtual services where possible to limit the spread of this virus.    For more information about COVID19 and options for caring for yourself at home, please visit the CDC website at https://www.cdc.gov/coronavirus/2019-ncov/about/steps-when-sick.html  For more options for care at [facility], please visit our website at [email or web address]    For more information, please use the [place] Department of Health COVID-19 Website: https://www.health.[facility].[place].us/diseases/coronavirus/index.html  [place] Department of Health ([facility]) COVID-19 Hotlines (Interpreters available):      Health questions: Phone Number: [phone] or [phone] and Hours: 7 a.m. to 7 p.m.    Schools and  questions: Phone Number: [phone] or [phone] and Hours 7 a.m. to 7 p.m.

## 2021-06-07 NOTE — PROGRESS NOTES
"Federico Bright is a 24 y.o. male who is being evaluated via a billable video visit.      The patient has been notified of following:     \"This video visit will be conducted via a call between you and your physician/provider. We have found that certain health care needs can be provided without the need for an in-person physical exam.  This service lets us provide the care you need with a video conversation.  If a prescription is necessary we can send it directly to your pharmacy.  If lab work is needed we can place an order for that and you can then stop by our lab to have the test done at a later time.    Video visits are billed at different rates depending on your insurance coverage. Please reach out to your insurance provider with any questions.    If during the course of the call the physician/provider feels a video visit is not appropriate, you will not be charged for this service.\"    Patient has given verbal consent to a Video visit? Yes    Patient would like to receive their AVS by AVS Preference: Matthew.    Patient would like the video invitation sent by: Text to cell phone:[phone]  Will anyone else be joining your video visit? No      Video Start Time: 11:18 AM    Type of service:  Video Visit    Video End Time (time video stopped):  11:30 AM   Originating Location (pt. Location): Home    Distant Location (provider location):  [facility]     Mode of Communication:  Video Conference via  [employer]    Assessment/plan     Chief Complaint   Patient presents with     Hospital Visit Follow Up     WW cellulitis on 04/26/20, pt states looking much better but still on ABX, concerns for bone spur on bottom of right foot / heal in xray - not having pain         Federico was seen today for hospital visit follow up.    Diagnoses and all orders for this visit:    MIESHA (generalized anxiety disorder)  This locked down and working from home might have worsened his anxiety but " dealing it very well.  Continue on Lexapro no side effect  Is alone with his cat right now  Cellulitis of right lower extremity  On exam I did not appreciate much of the cellulitis mild swelling on the right outer side of the ankle.  Is to keep the leg elevated also ice pack if there is any discomfort and finish the course of antibiotic  Open wound  Comments:  outer side of right heel , wound care discussed in detail          Subjective:      HPI: Federico Bright is a 24 y.o. male who we talked  video visit for Follow up on his cellulitis was seen at ER 4/26 , currently on antibiotics feel redness and pain getting better no new fever chills .  Concern about bone spur on heel which was accidental finding on xray denies any heel pain.      I have personally  went over  patient's allergies, medications, past medical history, family history, social history, rooming notes and problem list in detail and updated the patient record as necessary.      Past Medical History:   Diagnosis Date     Anxiety      Depression      Fracture Of The Nasal Bones     Created by Conversion      Past Surgical History:   Procedure Laterality Date     tonsils       WISDOM TOOTH EXTRACTION       Patient has no known allergies.  Current Outpatient Medications   Medication Sig Dispense Refill     cephalexin (KEFLEX) 500 MG capsule Take 1 capsule (500 mg total) by mouth 4 (four) times a day for 7 days. 28 capsule 0     escitalopram oxalate (LEXAPRO) 20 MG tablet Take 1 tablet (20 mg total) by mouth daily. 90 tablet 3     LORazepam (ATIVAN) 0.5 MG tablet Take 1 tablet (0.5 mg total) by mouth every 8 (eight) hours as needed for anxiety. 15 tablet 0     No current facility-administered medications for this visit.      Family History   Problem Relation Age of Onset     Cancer Maternal Grandmother      Cancer Maternal Grandfather      Cancer Paternal Grandmother      Cancer Paternal Grandfather        Patient Active Problem List   Diagnosis      "Allergies     Palpitations     Asperger's Disorder     ADHD, Predominantly Inattentive Type     Ingrowing toenail     Generalized anxiety disorder       Review of Systems   12 point comprehensive review of systems was negative except as noted and HPI     Social History     Social History Narrative     Not on file       Objective:     Vitals:    04/30/20 1056   Weight: (!) 250 lb (113.4 kg)   Height: 6' 1\" (1.854 m)      Exam: no acute distress , oriented to time place and person, normal speech .  Ankle/heel: No marked erythema or swelling unable to appreciate pitting edema.  Noninfected nondraining open sore on the right side of the heel  This note has been dictated using voice recognition software. Any grammatical or context distortions are unintentional and inherent to the software  Magaly Denton MD        "

## 2021-06-07 NOTE — PROGRESS NOTES
"Federico Bright is a 24 y.o. male who is being evaluated via a billable telephone visit.      The patient has been notified of following:     \"This telephone visit will be conducted via a call between you and your physician/provider. We have found that certain health care needs can be provided without the need for a physical exam.  This service lets us provide the care you need with a short phone conversation.  If a prescription is necessary we can send it directly to your pharmacy.  If lab work is needed we can place an order for that and you can then stop by our lab to have the test done at a later time.    Telephone visits are billed at different rates depending on your insurance coverage. During this emergency period, for some insurers they may be billed the same as an in-person visit.  Please reach out to your insurance provider with any questions.    If during the course of the call the physician/provider feels a telephone visit is not appropriate, you will not be charged for this service.\"    Patient has given verbal consent to a Telephone visit? Yes    Patient would like to receive their AVS by AVS Preference: Matthew.    Additional provider notes: This patient's visit was changed from a regular office visit to a billable telephone visit, due to the recent coronavirus issue, and the patient was comfortable with that.     ASSESSMENT:  1. Suspected 2019 Novel Coronavirus Infection    By the sounds of it, he is pretty suspicious of having the coronavirus issue, so I told him that he should probably quarantine and stay so until he has been symptomatic free for 3 days per current CDC guidelines.  He said that he needs a note for his work I am happy to provide that for him and he can access that via my chart.  He can continue with symptomatic treatment including Tylenol for fever reduction and cough medications and follow-up if he seems to be getting worse.          PLAN:  There are no Patient Instructions on " file for this visit.    No orders of the defined types were placed in this encounter.    There are no discontinued medications.    No follow-ups on file.    CHIEF COMPLAINT:  Chief Complaint   Patient presents with     Cough     Started about 1.5 weeks ago - persistant on/off cough, occasional fever, today couldn't stop coughing for about 1 hour, fatigue, headaches, SOB - today has been the worst       HISTORY OF PRESENT ILLNESS:  Federico is a 24 y.o. male presenting for a telephone visit today for a follow-up.  He did contact our on care department and they said that he probably is a bit suspected for having the CO VID issue.  He has been quarantine at home.  He does not a fever anymore but he still has a cough.  It seems to bother him more in the morning and in the evening now, but it can still bother him through the day.  He occasionally will get short of breath and has some chest tightness.  He has been more fatigued and sleeping more.  He thinks that overall is getting better but he knows that he is not supposed to go back to work and needs a note for that.    REVIEW OF SYSTEMS:     All other systems are negative.    PFSH:      TOBACCO USE:  Social History     Tobacco Use   Smoking Status Never Smoker   Smokeless Tobacco Never Used         MEDICATIONS:  Current Outpatient Medications   Medication Sig Dispense Refill     escitalopram oxalate (LEXAPRO) 20 MG tablet Take 1 tablet (20 mg total) by mouth daily. 90 tablet 3     LORazepam (ATIVAN) 0.5 MG tablet Take 1 tablet (0.5 mg total) by mouth every 8 (eight) hours as needed for anxiety. 15 tablet 0     No current facility-administered medications for this visit.              Phone call duration:  13 minutes    MAE Nelson MD

## 2021-06-07 NOTE — TELEPHONE ENCOUNTER
I think the issue would be, how bad is the cough?    If it's constant and all day still, he probably shouldn't go to work.    If it's just a bit in the morning and evening, and he can control it, and won't get dirty looks at work, he could probably return.  It's also probably a conversation with his work people.    TS    
Left message to call back for: Brian   Information to relay to patient:  Left Dr. Marie's message below.    JOSE ZhuA     
Spoke with Dr. Marie a week ago, suspected COVID. Patient has been isolated since then and off of work.     As of a day or two ago, the symptoms have subsided.   -Patient states he had all symptoms except for the lack of taste and smell    Now, patient states that all of his symptoms have resolved except for the cough. The cough is not nearly as severe as it was.     Patient questions if he still needs to isolate, what to do about going into public, and wonders if he can get a note to go back to work.     Patient states he has MyChart    Okay to leave a detailed message on voicemail.       Reason for Disposition    COVID-19 Home Isolation, questions about    Protocols used: CORONAVIRUS (COVID-19) DIAGNOSED OR [id number] 3.30.20    Caryn Wood RN Triage Nurse Advisor    
Statement Selected

## 2021-06-11 NOTE — TELEPHONE ENCOUNTER
Patient on the lab only schedule 9/3 for Covid Serology test. Please place order or reach out to patient.  Thank You,  Rosemary Jarrett

## 2021-06-12 NOTE — PROGRESS NOTES
"Federico Bright is a 24 y.o. male who is being evaluated via a billable telephone visit.      The patient has been notified of following:     \"This telephone visit will be conducted via a call between you and your physician/provider. We have found that certain health care needs can be provided without the need for a physical exam.  This service lets us provide the care you need with a short phone conversation.  If a prescription is necessary we can send it directly to your pharmacy.  If lab work is needed we can place an order for that and you can then stop by our lab to have the test done at a later time.    Telephone visits are billed at different rates depending on your insurance coverage. During this emergency period, for some insurers they may be billed the same as an in-person visit.  Please reach out to your insurance provider with any questions.    If during the course of the call the physician/provider feels a telephone visit is not appropriate, you will not be charged for this service.\"    Patient has given verbal consent to a Telephone visit? Yes    What phone number would you like to be contacted at? [phone]    Patient would like to receive their AVS by AVS Preference: Matthew.    Additional provider notes:   Concern for COVID-19  About how many days ago did these symptoms start? 4  Is this your first visit for this illness? Yes  In the 14 days before your symptoms started, have you had close contact with someone with COVID-19 (Coronavirus)? No.  Do you have a fever or chills? No  Are you having new or worsening difficulty breathing? No  Do you have new or worsening cough? No  Have you had any new or unexplained body aches? YES  Have you experienced any of the following NEW symptoms?    Headache: No    Sore throat: No    Loss of taste or smell: YES    Chest pain: No    Diarrhea: No    Rash: No  What treatments have you tried? none  Who do you live with?   Are you, or a household member, a " healthcare worker or a ? No  Do you live in a nursing home, group home, or shelter? No  Do you have a way to get food/medications if quarantined? Yes, I have a friend or family member who can help me.     [place] a chill once, thought it was due to the cold temperature.    Has decreased appetite.        Assessment/Plan:  1. Fatigue, unspecified type    - Symptomatic COVID-19 Virus (CORONAVIRUS) PCR; Future    2. Myalgia    - Symptomatic COVID-19 Virus (CORONAVIRUS) PCR; Future    3. Decreased taste and smell    - Symptomatic COVID-19 Virus (CORONAVIRUS) PCR; Future    4. Decreased appetite    - Symptomatic COVID-19 Virus (CORONAVIRUS) PCR; Future    Letter to be off work this week.  A friend will pick it up at the clinic.  ER if difficulty breathing.  I explained possible false negative test results.    Phone call duration:  16 minutes    Javed Champagne MD

## 2021-06-13 NOTE — PROGRESS NOTES
Federico Bright is a 25 y.o. male who is being evaluated via a billable telephone visit.        ASSESSMENT:  1. Attention deficit disorder (ADD) without hyperactivity    We can try some Strattera at this time.  He really does not want to go on any of the stimulant medications and I presented this is really the only other alternative if he does not want to pursue a stimulant type of medication.  We talked about side effects of this medication including nausea which is why we will taper it upward very slowly over the next couple of weeks.  He will let me know if he is having any trouble with it and I would like to recheck with him in about 4 to 6 weeks to make sure that he is getting a good effect from it whether we need to titrate the dose any further.      - atomoxetine (STRATTERA) 10 MG capsule; Take 1 capsule (10 mg total) by mouth daily.  Dispense: 7 capsule; Refill: 0  - atomoxetine (STRATTERA) 18 MG capsule; Take 1 capsule (18 mg total) by mouth daily.  Dispense: 7 capsule; Refill: 0  - atomoxetine (STRATTERA) 25 MG capsule; Take 1 capsule (25 mg total) by mouth daily.  Dispense: 30 capsule; Refill: 2        Preventive Health Care:      PLAN:  There are no Patient Instructions on file for this visit.    No orders of the defined types were placed in this encounter.      No follow-ups on file.    CHIEF COMPLAINT:  Chief Complaint   Patient presents with     ADHD     Not on med at this time, but in the past has found that the ADHD meds have worse side effects vs the good it does. Would like to discuss options       HISTORY OF PRESENT ILLNESS:  Federico is a 25 y.o. male calling in to the clinic today for concerns about some possible ADHD.  He has been diagnosed with this in the past.  He has been on stimulant medications but really did not do very well of them.  It really did not like how it made him feel and it bothered his appetite and his sleep patterns.  He has never been on Strattera or anything else for  "ADHD.  He has not done any counseling for it.  He notes that it bothers his work and has an inability to focus very well and it takes him a lot longer to get things done at work and at home.  He is interested in trying a different kind of medication if one exists.    REVIEW OF SYSTEMS:       All other systems are negative.    PFSH:    Reviewed      TOBACCO USE:  Social History     Tobacco Use   Smoking Status Never Smoker   Smokeless Tobacco Never Used           The visit lasted a total of 21 minutes   CA intake time  10 minutes    Telephone Consent was completed by  staff and confirmed by     I have reviewed and updated the patient's Past Medical History, Social History, Family History as appropriate.    MEDICATIONS: Reviewed and updated per CA and MD  Current Outpatient Medications   Medication Sig Dispense Refill     escitalopram oxalate (LEXAPRO) 20 MG tablet TAKE 1 TABLET(20 MG) BY MOUTH DAILY 90 tablet 3     atomoxetine (STRATTERA) 10 MG capsule Take 1 capsule (10 mg total) by mouth daily. 7 capsule 0     atomoxetine (STRATTERA) 18 MG capsule Take 1 capsule (18 mg total) by mouth daily. 7 capsule 0     atomoxetine (STRATTERA) 25 MG capsule Take 1 capsule (25 mg total) by mouth daily. 30 capsule 2     No current facility-administered medications for this visit.            The patient has been notified of following:     \"This telephone visit will be conducted via a call between you and your physician/provider. We have found that certain health care needs can be provided without the need for a physical exam.  This service lets us provide the care you need with a short phone conversation.  If a prescription is necessary we can send it directly to your pharmacy.  If lab work is needed we can place an order for that and you can then stop by our lab to have the test done at a later time.    If during the course of the call the physician/provider feels a telephone visit is not appropriate, you will not be " "charged for this service.\"        The patient has been notified of following:     \"This telephone visit will be conducted via a call between you and your physician/provider. We have found that certain health care needs can be provided without the need for a physical exam.  This service lets us provide the care you need with a short phone conversation.  If a prescription is necessary we can send it directly to your pharmacy.  If lab work is needed we can place an order for that and you can then stop by our lab to have the test done at a later time.    Telephone visits are billed at different rates depending on your insurance coverage. During this emergency period, for some insurers they may be billed the same as an in-person visit.  Please reach out to your insurance provider with any questions.    If during the course of the call the physician/provider feels a telephone visit is not appropriate, you will not be charged for this service.\"    Patient has given verbal consent to a Telephone visit? Yes    What phone number would you like to be contacted at? [phone]    Patient would like to receive their AVS by AVS Preference: Matthew.      MAE Nelson MD   "

## 2021-06-16 PROBLEM — F41.1 GENERALIZED ANXIETY DISORDER: Status: ACTIVE | Noted: 2019-09-09

## 2021-06-17 NOTE — PROGRESS NOTES
Subjective:      Federico Bright is a 22 y.o. male who presents today for elevated blood pressure.    Silas is here because his psychiatrist (Dr. Helton) has been following a radial blood pressure each visit.  He states at the [facility] office his blood pressures have been 130's/90s. Federico has not been checking this at home.   He currently denies chest pain, visual changes, numbness, tingling, shortness of breath, palpitations, headache, nausea, and vomiting.  Today's blood pressure in the office is within normal limits.    History of anxiety and depression.  Currently taking Lexapro 10 mg daily.  Tolerating medication well.  Has been on medication for past 1 year.  States he feels his mood is stable.  He will continue to see [facility] as needed.  Denies thoughts of harm to self, others, and suicidal ideations.    Reviewed past medical/surgical history, family history, social history, medications and updated chart below.       No Known Allergies  Past Medical History:   Diagnosis Date     Anxiety      Depression      Past Surgical History:   Procedure Laterality Date     tonsils       WISDOM TOOTH EXTRACTION       Social History     Social History     Marital status: Single     Spouse name: N/A     Number of children: N/A     Years of education: N/A     Occupational History     Not on file.     Social History Main Topics     Smoking status: Never Smoker     Smokeless tobacco: Never Used     Alcohol use No     Drug use: No      Comment: Previous use of psychedelics     Sexual activity: Not on file     Other Topics Concern     Not on file     Social History Narrative     Family History   Problem Relation Age of Onset     Cancer Maternal Grandmother      Cancer Maternal Grandfather      Cancer Paternal Grandmother      Cancer Paternal Grandfather      Current Outpatient Prescriptions   Medication Sig Dispense Refill     escitalopram oxalate (LEXAPRO) 10 MG tablet Take 1 tablet (10 mg total) by  mouth daily. 30 tablet 4     No current facility-administered medications for this visit.      Patient Active Problem List    Diagnosis Date Noted     Fungal infection 02/05/2015     Ingrowing toenail 02/05/2015     Paronychia 02/05/2015     Allergies      Palpitations      Fungal Infections      Asperger's Disorder      ADHD, Predominantly Inattentive Type      Fracture Of The Nasal Bones        Review of Systems   Constitutional: Negative.   HENT: Negative.   Eyes: Negative.   Respiratory: Negative.   Cardiovascular: Per patient elevated blood pressures for past 6 months.   Gastrointestinal: Negative.   Endocrine: Negative.   Genitourinary: Negative.   Musculoskeletal: Negative.   Skin: Negative.   Allergic/Immunologic: Negative.   Neurological: Negative.   Hematological: Negative.   Psychiatric/Behavioral: Hx of Depression, anxiety.     Objective:    Physical Exam   /82 (Patient Site: Left Arm, Patient Position: Sitting, Cuff Size: Adult Large)  Pulse 76  Wt (!) 269 lb 12.8 oz (122.4 kg)  BMI 35.6 kg/m2    Constitutional: Alert and oriented x3, well nourished without any acute distress  Cardiovascular: Normal S1 and S2. Regular rate, rhythm and no murmur, rubs or gallops. Palpable distal pulses bilaterally.  Pulmonary/Chest: Normal effort. Lungs clear to auscultation in all lobes. Without wheezing, rhonchi or crackles.    Abdominal: Soft, non tenderness. There is no rebound or guarding. Bowel sounds x4. Without organomegaly. No CVA tenderness.  Psychiatric: Normal mood and affect.       Assessment/Plan:    1. Anxiety  MIESHA-7 Total: 2 (5/11/2018 10:00 AM)  PHQ-9 Total Score: 0 (5/11/2018 10:00 AM)    History of anxiety and depression.  Currently taking Lexapro 10 mg daily.  Tolerating medication for past 1 year.  Refill needed.  Denies thoughts of harm to self, others, and suicidal ideations.  Continue to follow with [facility]-Dr. Helton per recommendations.  Dr. Helton would like PCP to  take over Lexapro as he is coming to as needed visit at [facility].  Discussed red flags, safety plan, that would warrant urgent evaluation.  Discussed side effects of medications and proper use. Patient verbalized understanding.  Recommended annual physical exam or sooner for any acute concerns.   Patient agreed and appeared pleased with plan      - escitalopram oxalate (LEXAPRO) 10 MG tablet; Take 1 tablet (10 mg total) by mouth daily.  Dispense: 30 tablet; Refill: 4    2. Elevated blood pressure reading without diagnosis of hypertension  Per patient elevated blood pressure at [facility] office for past 6 months.  Today vitals are within normal limits.  Vitals:    05/11/18 0951   BP: 128/82   Pulse: 76     Will draw labs and follow up with results once received.  Denies chest pain, visual changes, numbness, tingling, shortness of breath, palpitations, headache, nausea, and vomiting.  For hypertension discussed checking blood pressure same time daily and place in log. Recommended seated for 10 minutes prior to taking blood pressure. Also discussed lifestyle changes, decreasing salt intake to no more than 2 grams per day and increasing exercise.  Will follow up in 1-2 weeks and discussed to bring log with for review. Also bring in home blood pressure cuff at next office visit to make sure it correlates with office reading.  Follow up in any questions or concern. Patient agreed with plan!    - Comprehensive Metabolic Panel  - HM2(CBC w/o Differential)  - Thyroid Cascade  - Vitamin D, Total (25-Hydroxy)  - Urinalysis    Office visit and charting completed with student LIO Gutierrez CNP  5/11/2018

## 2021-06-20 NOTE — PROGRESS NOTES
Assessment/Plan:        The following diagnoses and plans were discussed with patient at today's visit.       1. Difficulty concentrating  2. Anxiety and depression  PHQ 0 score is 0  He has stopped his Lexapro and feeling mood is stable.   Given hx of ADHD and difficulty concentrating,   I did recommend formal evaluation he does have a psychiatrist and will follow up with him.   He has stopped seening therapist, psychologist.   Patient agreeable to plan and referral placed, will await formal evaluation prior to adderall prescription.    Patient  verbalized understanding and they both agreed with plan.   Encouraged patient to return to clinic for annual fasting physical, if sooner with any acute concerns.     - Ambulatory referral to Psychiatry          Subjective:    Patient ID: Federico Bright is a 22 y.o. male.    HPI    Federico Bright is a 22 y.o. male who is here follow up mood/medication check.   History of anxiety, stopped Lexapro two weeks ago he states he did wean off of medication,   States slowly. Overall states he is feeling well, but now he notes he is having trouble concentrating.   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   Notes history of ADHD - he state he has been on and off Adderall since childhood- through school, early elementary school. He does have a psychiatrist at [facility] in [place] (Dr. Yeager sp?), and states a formal evaluation as been completed.   Denies thoughts of harming himself or others.   Endorses good support system - family, friends.   Family history - both sides of the family have anxiety and depression - mother and father with history of anxiety and depression.   Father with history anxiety and mother with depression. PGM, paternal uncle with anxiety  Father states history of hypothyroidism with father and PGM  Psychiatry appointment 1/6/2017      The following information was  "reviewed and updated with patient at today's visit.     No Known Allergies  No current outpatient prescriptions on file.     No current facility-administered medications for this visit.      Past Medical History:   Diagnosis Date     Anxiety      Depression      Past Surgical History:   Procedure Laterality Date     tonsils       WISDOM TOOTH EXTRACTION       Social History     Social History     Marital status: Single     Spouse name: N/A     Number of children: N/A     Years of education: N/A     Social History Main Topics     Smoking status: Never Smoker     Smokeless tobacco: Never Used     Alcohol use No     Drug use: No      Comment: Previous use of psychedelics     Sexual activity: Not Asked     Other Topics Concern     None     Social History Narrative     Family History   Problem Relation Age of Onset     Cancer Maternal Grandmother      Cancer Maternal Grandfather      Cancer Paternal Grandmother      Cancer Paternal Grandfather      Objective:     Physical Exam   /72 (Patient Site: Left Arm, Patient Position: Sitting, Cuff Size: Adult Large)  Pulse 92  Ht 6' 1.25\" (1.861 m)  Wt (!) 236 lb 14.4 oz (107.5 kg)  BMI 31.04 kg/m2    Vitals:    09/21/18 1255   BP: 110/72   Pulse: 92       Review of Systems  ROS negative other than noted in HPI.         Objective:    Physical Exam  Constitutional: Alert and oriented x3, well nourished without any acute distress  HENT:   Right Ear: External ear normal.   Left Ear: External ear normal.   Nose: Nose normal.   Mouth/Throat: Oropharynx is clear and moist.   Eyes: Conjunctivae and EOM are normal. Pupils are equal, round, and reactive to light. Right eye exhibits no discharge. Left eye exhibits no discharge.   Cardiovascular: Normal S1 and S2. Regular rate, rhythm and no murmur, rubs or gallops.   Pulmonary/Chest: Normal effort. Lungs clear to auscultation in all lobes. Without wheezing, rhonchi or crackles.    Skin: Dry and intact; without rashes or lesions. " "  Psychiatric: Normal        Vitals:    09/21/18 1255   BP: 110/72   Patient Site: Left Arm   Patient Position: Sitting   Cuff Size: Adult Large   Pulse: 92   Weight: (!) 236 lb 14.4 oz (107.5 kg)   Height: 6' 1.25\" (1.861 m)       No current outpatient prescriptions on file.     No current facility-administered medications for this visit.        "

## 2021-06-20 NOTE — LETTER
Letter by Lloyd Marie MD at      Author: Lloyd Marie MD Service: -- Author Type: --    Filed:  Encounter Date: 4/10/2020 Status: (Other)       To whom it may concern:    I am the family physician for Mr Federico Bright, who had a telephone visit with me today.  He expresses symptoms that are suspicious for COVID-19, and I have advised him, following current CDC guidelines, that he quarantine himself for now, and for 3 days after symptoms have ceased.  His first day unable to work was April 7, 2020, and I do not know what end date he will require, as it will depend on his symptoms.    If this office can be of further assistance, please let me know.      Sincerely,      Lloyd Marie MD  [facility]

## 2021-06-21 NOTE — LETTER
Letter by Javed Champagne MD at      Author: Javed Champagne MD Service: -- Author Type: --    Filed:  Encounter Date: 10/19/2020 Status: (Other)         October 19, 2020     Patient: Federico Bright   YOB: 1995   Date of Visit: 10/19/2020       To Whom It May Concern:    It is my medical opinion that Federico Bright should remain out of work until 10/26/20.  It is possible that the return to work date might change, earlier or later, depending upon test results and course of illness.    If you have any questions or concerns, please don't hesitate to call.    Sincerely,        Electronically signed by Javed Champagne MD

## 2021-06-28 NOTE — PROGRESS NOTES
Progress Notes by Lloyd Marie MD at 10/24/2019 10:40 AM     Author: Lloyd Marie MD Service: -- Author Type: Physician    Filed: 10/25/2019 12:28 PM Encounter Date: 10/24/2019 Status: Signed    : Lloyd Marie MD (Physician)       MALE PREVENTATIVE EXAM    Assessment and Plan:       1. Routine medical exam    Generally the patient is doing very well.  We discussed healthy lifestyles, including adequate exercise (3-4 times a week for 20-30 minutes), and a healthy diet.  Patient should return for annual physicals, and we can also see them here as needed.       2. Generalized anxiety disorder    We will continue the same medications, being Lexapro 20 mg a day, as well as lorazepam 0.5 mg as needed, but he states that he has not taken that for weeks.  He is very pleased with the Lexapro and will continue with it.    3. Screening for diabetes mellitus    - Basic Metabolic Panel    4. Screening, lipid    - Lipid Profile        Next follow up:  No follow-ups on file.    Immunization Review  Adult Imm Review: No immunizations due today  BMI: 32    I discussed the following with the patient:   Adult Healthy Living: Importance of regular exercise  Healthy nutrition  Getting adequate sleep  Stress management  Use of seat belts        Subjective:   Chief Complaint: Federico Bright is an 23 y.o. male here for a preventative health visit.     HPI: Here today for a full physical.  He is generally doing quite well.  He knows that he needs to work on weight and losing weight and eating better.  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    He does not really exercise a lot either we a long discussion about trying to find something that he enjoys doing that he can do regularly.    He is doing well on his medication that he was started recently for anxiety, being Lexapro.  He is tolerating it very well and has no problems  "or complications with it.  He notes of no significant side effects with it.  He was given a prescription for some lorazepam that he could use as needed he used a couple of them initially but has not needed any for several weeks.    Healthy Habits  Are you taking a daily aspirin? No  Do you typically exercising at least 40 min, 3-4 times per week?  NO  Do you usually eat at least 4 servings of fruit and vegetables a day, include whole grains and fiber and avoid regularly eating high fat foods? NO  Have you had an eye exam in the past two years? Yes  Do you see a dentist twice per year? NO  Do you have any concerns regarding sleep? YES, trouble falling and staying asleep    Safety Screen  If you own firearms, are they secured in a locked gun cabinet or with trigger locks? The patient does not own any firearms  Do you feel you are safe where you are living?: Yes (9/8/2019  4:04 PM)  Do you feel you are safe in your relationship(s)?: Yes (9/8/2019  4:04 PM)      Review of Systems:  Please see above.  The rest of the review of systems are negative for all systems.     Cancer Screening     Patient has no health maintenance due at this time          Patient Care Team:  Lloyd Marie MD as PCP - General (Family Medicine)  Lloyd Marie MD as Assigned PCP        History     Reviewed By Date/Time Sections Reviewed    Yahaira Ley CMA 10/24/2019 10:33 AM Tobacco            Objective:   Vital Signs:   Visit Vitals  /70 (Patient Site: Left Arm, Patient Position: Sitting, Cuff Size: Adult Large)   Pulse 79   Ht 6' 1\" (1.854 m)   Wt (!) 247 lb (112 kg)   SpO2 95%   BMI 32.59 kg/m           PHYSICAL EXAM    Well developed, well nourished, no acute distress.  HEENT: normocephalic/atraumatic, PERRLA/EOMI, TMs: Gray, normal light reflex, no nasal discharge.  Oral mucosa: no erythema/exudate  Neck: No LAD/masses/thyromegaly/bruits  Lungs: clear bilaterally  Heart: regular rate and rhythm, no murmurs/gallops/rubs.  Abdomen: " Normal bowel sounds, soft, non-tender, non-distended, no masses, neg Delacruz's/McBurney's, no rebound/guarding  Genital: Bilaterally descended testes, no masses, non-tender, no hernia.  No urethral discharge or erythema.  No lesions, normal phallus.  Lymphatics: no supraclavicular/axillary/epitrochlear/inguinal LAD. No edema.  Neuro: A&O x 3, CN II-XII intact, strength 5/5, reflexes symmetric, sensory intact to light touch.  Psych: Behavior appropriate, engaging.  Thought processes congruent, non-tangential.  Musculoskeletal: no gross deformities.  Skin: no rashes or lesions.            Medication List          Accurate as of October 24, 2019 11:59 PM. If you have any questions, ask your nurse or doctor.            CONTINUE taking these medications    escitalopram oxalate 20 MG tablet  Also known as:  LEXAPRO  INSTRUCTIONS:  Take 1 tablet (20 mg total) by mouth daily.        LORazepam 0.5 MG tablet  Also known as:  ATIVAN  INSTRUCTIONS:  Take 1 tablet (0.5 mg total) by mouth every 8 (eight) hours as needed for anxiety.               Additional Screenings Completed Today:

## 2021-09-18 ENCOUNTER — HEALTH MAINTENANCE LETTER (OUTPATIENT)
Age: 26
End: 2021-09-18

## 2021-10-23 DIAGNOSIS — F41.1 GENERALIZED ANXIETY DISORDER: Primary | ICD-10-CM

## 2021-10-24 RX ORDER — ESCITALOPRAM OXALATE 20 MG/1
TABLET ORAL
Qty: 90 TABLET | Refills: 0 | Status: SHIPPED | OUTPATIENT
Start: 2021-10-24 | End: 2022-02-11

## 2021-10-25 NOTE — TELEPHONE ENCOUNTER
"Last Written Prescription Date:  10/2/2020  Last Fill Quantity: 90,  # refills: 3   Last office visit provider: 12/17/2020- Lloyd Marie MD    escitalopram oxalate (LEXAPRO) 20 MG tablet 90 tablet 3 10/2/2020  No   Sig: TAKE 1 TABLET(20 MG) BY MOUTH DAILY   Sent to pharmacy as: escitalopram 20 mg tablet (LEXAPRO)   E-Prescribing Status: Receipt confirmed by pharmacy (10/2/2020 10:30 AM CDT       Requested Prescriptions   Pending Prescriptions Disp Refills     escitalopram (LEXAPRO) 20 MG tablet [Pharmacy Med Name: ESCITALOPRAM 20MG TABLETS] 90 tablet 3     Sig: TAKE 1 TABLET(20 MG) BY MOUTH DAILY       SSRIs Protocol Passed - 10/23/2021  3:23 AM        Passed - Recent (12 mo) or future (30 days) visit within the authorizing provider's specialty     Patient has had an office visit with the authorizing provider or a provider within the authorizing providers department within the previous 12 mos or has a future within next 30 days. See \"Patient Info\" tab in inbasket, or \"Choose Columns\" in Meds & Orders section of the refill encounter.              Passed - Medication is active on med list        Passed - Patient is age 18 or older             Vi Mesa RN 10/24/21 8:51 PM  "

## 2021-10-26 ENCOUNTER — NURSE TRIAGE (OUTPATIENT)
Dept: NURSING | Facility: CLINIC | Age: 26
End: 2021-10-26

## 2021-10-27 ENCOUNTER — OFFICE VISIT (OUTPATIENT)
Dept: FAMILY MEDICINE | Facility: CLINIC | Age: 26
End: 2021-10-27
Payer: COMMERCIAL

## 2021-10-27 VITALS
WEIGHT: 266 LBS | SYSTOLIC BLOOD PRESSURE: 115 MMHG | HEART RATE: 90 BPM | HEIGHT: 73 IN | OXYGEN SATURATION: 96 % | DIASTOLIC BLOOD PRESSURE: 76 MMHG | BODY MASS INDEX: 35.25 KG/M2

## 2021-10-27 DIAGNOSIS — R79.89 ELEVATED LFTS: ICD-10-CM

## 2021-10-27 DIAGNOSIS — K21.9 GASTROESOPHAGEAL REFLUX DISEASE WITHOUT ESOPHAGITIS: ICD-10-CM

## 2021-10-27 DIAGNOSIS — R39.89 ABNORMAL URINE COLOR: ICD-10-CM

## 2021-10-27 DIAGNOSIS — R19.5 PALE STOOL: Primary | ICD-10-CM

## 2021-10-27 LAB
ALBUMIN SERPL-MCNC: 4.2 G/DL (ref 3.5–5)
ALBUMIN UR-MCNC: 10 MG/DL
ALP SERPL-CCNC: 166 U/L (ref 45–120)
ALT SERPL W P-5'-P-CCNC: 314 U/L (ref 0–45)
ANION GAP SERPL CALCULATED.3IONS-SCNC: 12 MMOL/L (ref 5–18)
APPEARANCE UR: CLEAR
AST SERPL W P-5'-P-CCNC: 157 U/L (ref 0–40)
BILIRUB SERPL-MCNC: 1.4 MG/DL (ref 0–1)
BILIRUB UR QL STRIP: NEGATIVE
BUN SERPL-MCNC: 12 MG/DL (ref 8–22)
CALCIUM SERPL-MCNC: 9.8 MG/DL (ref 8.5–10.5)
CHLORIDE BLD-SCNC: 104 MMOL/L (ref 98–107)
CO2 SERPL-SCNC: 24 MMOL/L (ref 22–31)
COLOR UR AUTO: YELLOW
CREAT SERPL-MCNC: 0.94 MG/DL (ref 0.7–1.3)
GFR SERPL CREATININE-BSD FRML MDRD: >90 ML/MIN/1.73M2
GLUCOSE BLD-MCNC: 82 MG/DL (ref 70–125)
GLUCOSE UR STRIP-MCNC: NEGATIVE MG/DL
HGB UR QL STRIP: NEGATIVE
KETONES UR STRIP-MCNC: 20 MG/DL
LEUKOCYTE ESTERASE UR QL STRIP: NEGATIVE
LIPASE SERPL-CCNC: 34 U/L (ref 0–52)
MUCOUS THREADS #/AREA URNS LPF: PRESENT /LPF
NITRATE UR QL: NEGATIVE
PH UR STRIP: 5.5 [PH] (ref 5–7)
POTASSIUM BLD-SCNC: 4.3 MMOL/L (ref 3.5–5)
PROT SERPL-MCNC: 7.3 G/DL (ref 6–8)
RBC #/AREA URNS AUTO: ABNORMAL /HPF
SODIUM SERPL-SCNC: 140 MMOL/L (ref 136–145)
SP GR UR STRIP: 1.03 (ref 1–1.03)
UROBILINOGEN UR STRIP-ACNC: 0.2 E.U./DL
WBC #/AREA URNS AUTO: ABNORMAL /HPF

## 2021-10-27 PROCEDURE — 99213 OFFICE O/P EST LOW 20 MIN: CPT | Performed by: NURSE PRACTITIONER

## 2021-10-27 PROCEDURE — 81001 URINALYSIS AUTO W/SCOPE: CPT | Performed by: NURSE PRACTITIONER

## 2021-10-27 PROCEDURE — 83690 ASSAY OF LIPASE: CPT | Performed by: NURSE PRACTITIONER

## 2021-10-27 PROCEDURE — 80053 COMPREHEN METABOLIC PANEL: CPT | Performed by: NURSE PRACTITIONER

## 2021-10-27 PROCEDURE — 36415 COLL VENOUS BLD VENIPUNCTURE: CPT | Performed by: NURSE PRACTITIONER

## 2021-10-27 ASSESSMENT — MIFFLIN-ST. JEOR: SCORE: 2235.45

## 2021-10-27 NOTE — PATIENT INSTRUCTIONS
Starting simple today with blood and urine.     If normal, this likely will pass on its own. If things are abnormal, we'll investigate.    If reflux is becoming a bothersome issue, there's daily medicine which can help keep it at bay. Just let me know if interested.      Patient Education     Tips to Control Acid Reflux    To control acid reflux, you ll need to make some basic diet and lifestyle changes. The simple steps outlined below may be all you ll need to ease discomfort.   Watch what you eat    Don't have fatty foods or spicy foods.    Eat fewer acidic foods, such as citrus and tomato-based foods. These can increase symptoms.    Limit drinking alcohol, caffeine, and fizzy beverages. All increase acid reflux.    Try limiting chocolate, peppermint, and spearmint. These can make acid reflux worse in some people.    Watch when you eat    Don't lie down for 3 hours after eating.    Don't snack before going to bed.    Raise your head  Raising your head and upper body by 4 to 6 inches helps limit reflux when you re lying down. Put blocks under the head of your bed frame or a wedge under your mattress to raise it.   Other changes    Lose weight, if you need to    Don t exercise near bedtime    Don't wear tight-fitting clothes    Limit aspirin and ibuprofen    Stop smoking    [employer] last reviewed this educational content on 6/1/2019 2000-2021 [employer]. All rights reserved. This information is not intended as a substitute for professional medical care. Always follow your healthcare professional's instructions.

## 2021-10-27 NOTE — PROGRESS NOTES
"Chief Complaint   Patient presents with     Abdominal Pain     heartburn, loss of appettit , dark urnine and light stool color , sx started 2 days ago , taking anti acid meds        HPI: Patient presents today with an episode of decreased appetite, pale stool, heartburn, and discolored urine.  This started a couple days ago and is slowly started to get better today.  Prior to this he had been eating pizza, lemonade, and chocolate.  He had taken multiple Tums to try to get heartburn symptoms under control.  After this is when he started to notice the urine and stool changes.  No recent travel.  No adventurous eating.  No unintentional weight loss.  Rare alcohol use.  No recreational drug use.  No chest pains.  No diaphoresis.  No profound fatigue issues.    ROS:Review of Systems - negative except for what's listed in the HPI    SH: The Patient's  reports that he has never smoked. He has never used smokeless tobacco. He reports that he does not drink alcohol and does not use drugs.      FH: The Patient's family history includes Cancer in his maternal grandfather, maternal grandmother, paternal grandfather, and paternal grandmother.     Meds:    Current Outpatient Medications   Medication     escitalopram (LEXAPRO) 20 MG tablet     No current facility-administered medications for this visit.       O:  /76   Pulse 90   Ht 1.854 m (6' 1\")   Wt 120.7 kg (266 lb)   SpO2 96%   BMI 35.09 kg/m      Physical Examination:   General appearance - alert, well appearing, and in no distress  Mental status - alert, oriented to person, place, and time  Eyes -PERRLA, sclera white  Lymphatics - no palpable lymphadenopathy  Chest - clear to auscultation, no wheezes, rales or rhonchi, symmetric air entry  Heart - normal rate and regular rhythm, S1 and S2 normal, no murmurs noted  Abdomen - soft, nontender, nondistended, no masses or organomegaly  Neurological - alert, oriented, normal speech, no focal findings or movement " disorder noted, neck supple without rigidity, cranial nerves II through XII intact, motor and sensory grossly normal bilaterally, normal muscle tone, no tremors, strength 5/5  Extremities - peripheral pulses normal, no peripheral edema  Skin - normal coloration and turgor.      A/P:       ICD-10-CM    1. Pale stool  R19.5 Comprehensive metabolic panel (BMP + Alb, Alk Phos, ALT, AST, Total. Bili, TP)     Comprehensive metabolic panel (BMP + Alb, Alk Phos, ALT, AST, Total. Bili, TP)   2. Gastroesophageal reflux disease without esophagitis  K21.9 Lipase     Lipase   3. Abnormal urine color  R39.89 UA reflex to Microscopic - lab collect     UA reflex to Microscopic - lab collect     Urine Microscopic Exam     Pale stool could be a result of taking too many antacids.  Slowly improving which is encouraging. He is interested in work-up to rule out more worrisome causes which is reasonable. Does admit to reflux type symptoms on a somewhat regular basis. Discussed lifestyle changes and the opportunity for preventative H2 blocker/PPI if interested.    Late addendum: Noticeably elevated LFTs.  Recheck with hepatitis panel.    Pale stool  - Comprehensive metabolic panel (BMP + Alb, Alk Phos, ALT, AST, Total. Bili, TP)  - Comprehensive metabolic panel (BMP + Alb, Alk Phos, ALT, AST, Total. Bili, TP)    Gastroesophageal reflux disease without esophagitis  - Lipase  - Lipase    Abnormal urine color  - UA reflex to Microscopic - lab collect  - UA reflex to Microscopic - lab collect  - Urine Microscopic Exam    Elevated LFTs  Hep a, b, c and repeat hepatic panel  Chuy Kumar, CNP      This note has been dictated using voice recognition software. Any grammatical or context distortions are unintentional and inherent to the software.

## 2021-10-27 NOTE — TELEPHONE ENCOUNTER
"Pt reports pale gray colored stool, orange urine, and a decreased appetite today.  Last night he had a significant episode of \"GERD\".      At the present time, he denies abdominal pain, jaundice, fever.      Triage disposition: See a provider within 24 hours.  Patient verbalized understanding and had no further questions.  Call transferred to scheduling.     COVID 19 Nurse Triage Plan/Patient Instructions    Please be aware that novel coronavirus (COVID-19) may be circulating in the community. If you develop symptoms such as fever, cough, or SOB or if you have concerns about the presence of another infection including coronavirus (COVID-19), please contact your health care provider or visit [email or web address].     Disposition/Instructions    In-Person Visit with provider recommended. Reference Visit Selection Guide.    Thank you for taking steps to prevent the spread of this virus.  o Limit your contact with others.  o Wear a simple mask to cover your cough.  o Wash your hands well and often.    Resources    [facility]: About COVID-19: [email or web address]    CDC: What to Do If You're Sick: www.cdc.gov/coronavirus/2019-ncov/about/steps-when-sick.html    CDC: Ending Home Isolation: www.cdc.gov/coronavirus/2019-ncov/hcp/disposition-in-home-patients.html     CDC: Caring for Someone: www.cdc.gov/coronavirus/2019-ncov/if-you-are-sick/care-for-someone.html     [facility]: Interim Guidance for Hospital Discharge to Home: www.health.[facility].mn.us/diseases/coronavirus/hcp/hospdischarge.pdf    [facility] clinical trials (COVID-19 research studies): clinicalaffairs.[facility]/n-clinical-trials     Below are the COVID-19 hotlines at the [facility] ([facility]). Interpreters are available.   o For health questions: Call 659-466-6732 or 1-421.119.1100 (7 a.m. to 7 p.m.)  o For questions about schools and childcare: Call 448-084-7122 or 1-603.690.2262 (7 a.m. to 7 p.m.) "               Krista Hernandez, RN/FNA        Reason for Disposition    Yellow eyes (jaundice)    [1] Stool is light gray or whitish AND [2] unexplained    Additional Information    Negative: Patient sounds very sick or weak to the triager    Protocols used: STOOLS - UNUSUAL COLOR-A-AH

## 2021-10-28 ENCOUNTER — NURSE TRIAGE (OUTPATIENT)
Dept: NURSING | Facility: CLINIC | Age: 26
End: 2021-10-28

## 2021-10-29 NOTE — ADDENDUM NOTE
Addended by: DELFINA SMITH on: 10/29/2021 08:37 AM     Modules accepted: Orders, Level of Service

## 2021-10-29 NOTE — TELEPHONE ENCOUNTER
Called patient this morning.  Received voicemail.  Okay to leave detailed message which I did.  Recommended coming in for lab only.  Orders placed.    Chuy Kumar NP

## 2021-10-29 NOTE — TELEPHONE ENCOUNTER
Pt calling tonight, states he missed 2 calls from the clinic today.     RN reviewed pt's chart, unable to find documentation as to why the clinic ws calling.  Labs are back but no provider comment as of yet. RN will route to provider care team to call pt back tomorrow.  Pt can be reached at [phone], leave a detailed VM if no answer.     COVID 19 Nurse Triage Plan/Patient Instructions    Please be aware that novel coronavirus (COVID-19) may be circulating in the community. If you develop symptoms such as fever, cough, or SOB or if you have concerns about the presence of another infection including coronavirus (COVID-19), please contact your health care provider or visit [email or web address].     Disposition/Instructions    Home care recommended. Follow home care protocol based instructions.    Thank you for taking steps to prevent the spread of this virus.  o Limit your contact with others.  o Wear a simple mask to cover your cough.  o Wash your hands well and often.    Resources    [facility]: About COVID-19: [email or web address]    CDC: What to Do If You're Sick: www.cdc.gov/coronavirus/2019-ncov/about/steps-when-sick.html    CDC: Ending Home Isolation: www.cdc.gov/coronavirus/2019-ncov/hcp/disposition-in-home-patients.html     CDC: Caring for Someone: www.cdc.gov/coronavirus/2019-ncov/if-you-are-sick/care-for-someone.html     [facility]: Interim Guidance for Hospital Discharge to Home: www.health.[facility].mn.us/diseases/coronavirus/hcp/hospdischarge.pdf    [facility] clinical trials (COVID-19 research studies): clinicalaffairs.[facility]/n-clinical-trials     Below are the COVID-19 hotlines at the [facility] ([facility]). Interpreters are available.   o For health questions: Call 253-534-0933 or 1-894.344.8424 (7 a.m. to 7 p.m.)  o For questions about schools and childcare: Call 101-042-2804 or 1-124.578.8860 (7 a.m. to 7 p.m.)                     Reason for  Disposition    [1] Follow-up call from patient regarding patient's clinical status AND [2] information NON-URGENT    Additional Information    Negative: ED call to PCP    Negative: Physician call to PCP    Negative: Call about patient who is currently hospitalized    Negative: Lab or radiology calling with CRITICAL test results    Negative: [1] Prescription not at pharmacy AND [2] was prescribed today by PCP    Negative: [1] Follow-up call from patient regarding patient's clinical status AND [2] information urgent    Negative: [1] Caller requests to speak ONLY to PCP AND [2] urgent question    Negative: [1] Caller requests to speak to PCP now AND [2] won't tell us reason for call  (Exception: if 10 pm to 6 am, caller must first discuss reason for the call)    Negative: Notification of hospital admission    Negative: Notification of death    Negative: Caller requesting lab results    Negative: Lab or radiology calling with test results    Protocols used: [facility]CP [facility] - [facility]

## 2021-11-18 ENCOUNTER — OFFICE VISIT (OUTPATIENT)
Dept: FAMILY MEDICINE | Facility: CLINIC | Age: 26
End: 2021-11-18
Payer: COMMERCIAL

## 2021-11-18 ENCOUNTER — NURSE TRIAGE (OUTPATIENT)
Dept: NURSING | Facility: CLINIC | Age: 26
End: 2021-11-18
Payer: COMMERCIAL

## 2021-11-18 VITALS
RESPIRATION RATE: 16 BRPM | HEART RATE: 91 BPM | BODY MASS INDEX: 34.17 KG/M2 | OXYGEN SATURATION: 97 % | TEMPERATURE: 99.2 F | SYSTOLIC BLOOD PRESSURE: 110 MMHG | DIASTOLIC BLOOD PRESSURE: 72 MMHG | WEIGHT: 259 LBS

## 2021-11-18 DIAGNOSIS — R05.9 COUGH: Primary | ICD-10-CM

## 2021-11-18 DIAGNOSIS — R07.0 THROAT PAIN: ICD-10-CM

## 2021-11-18 LAB — DEPRECATED S PYO AG THROAT QL EIA: NEGATIVE

## 2021-11-18 PROCEDURE — 99213 OFFICE O/P EST LOW 20 MIN: CPT | Performed by: PHYSICIAN ASSISTANT

## 2021-11-18 PROCEDURE — 87651 STREP A DNA AMP PROBE: CPT | Performed by: PHYSICIAN ASSISTANT

## 2021-11-18 PROCEDURE — U0005 INFEC AGEN DETEC AMPLI PROBE: HCPCS | Mod: 90 | Performed by: PHYSICIAN ASSISTANT

## 2021-11-18 PROCEDURE — U0003 INFECTIOUS AGENT DETECTION BY NUCLEIC ACID (DNA OR RNA); SEVERE ACUTE RESPIRATORY SYNDROME CORONAVIRUS 2 (SARS-COV-2) (CORONAVIRUS DISEASE [COVID-19]), AMPLIFIED PROBE TECHNIQUE, MAKING USE OF HIGH THROUGHPUT TECHNOLOGIES AS DESCRIBED BY CMS-2020-01-R: HCPCS | Mod: 90 | Performed by: PHYSICIAN ASSISTANT

## 2021-11-18 PROCEDURE — 99000 SPECIMEN HANDLING OFFICE-LAB: CPT | Performed by: PHYSICIAN ASSISTANT

## 2021-11-18 NOTE — TELEPHONE ENCOUNTER
Nasal congestion    Fever today 98.2    Ear pain    Sore throat    Diarrhea - that started yesterday    Other Symptoms started 3 days    Rapid covid test was negative    No shortness of breath    No cough    Advised that he be seen as he does have ear pain and symptoms of covid    Rosemary Glynn RN  [place] Nurse Advisor  11:40 AM  11/18/2021      COVID 19 Nurse Triage Plan/Patient Instructions    Please be aware that novel coronavirus (COVID-19) may be circulating in the community. If you develop symptoms such as fever, cough, or SOB or if you have concerns about the presence of another infection including coronavirus (COVID-19), please contact your health care provider or visit [email or web address].     Disposition/Instructions    In-Person Visit with provider recommended. Reference Visit Selection Guide.    Thank you for taking steps to prevent the spread of this virus.  o Limit your contact with others.  o Wear a simple mask to cover your cough.  o Wash your hands well and often.    Resources    [facility]: About COVID-19: [email or web address]    CDC: What to Do If You're Sick: www.cdc.gov/coronavirus/2019-ncov/about/steps-when-sick.html    CDC: Ending Home Isolation: www.cdc.gov/coronavirus/2019-ncov/hcp/disposition-in-home-patients.html     CDC: Caring for Someone: www.cdc.gov/coronavirus/2019-ncov/if-you-are-sick/care-for-someone.html     [facility]: Interim Guidance for Hospital Discharge to Home: www.health.[facility].mn.us/diseases/coronavirus/hcp/hospdischarge.pdf    [facility] clinical trials (COVID-19 research studies): clinicalaffairs.[facility]-clinical-trials     Below are the COVID-19 hotlines at the [place] [facility] ([facility]). Interpreters are available.   o For health questions: Call 159-845-9836 or 1-891.621.3577 (7 a.m. to 7 p.m.)  o For questions about schools and childcare: Call 203-436-4486 or 1-703.110.4406 (7 a.m. to 7 p.m.)                        Reason for Disposition    [place]    Additional Information    Negative: Severe difficulty breathing (e.g., struggling for each breath, speaks in single words)    Negative: Very weak (can't stand)    Negative: Sounds like a life-threatening emergency to the triager    Negative: Patient sounds very sick or weak to the triager    Negative: Fever > 103 F (39.4 C)    Negative: Fever > 101 F (38.3 C) and over 60 years of age    Negative: Fever > 100.0 F (37.8 C) and has diabetes mellitus or a weak immune system (e.g., HIV positive, cancer chemotherapy, organ transplant, splenectomy, chronic steroids)    Negative: Fever > 100.0 F (37.8 C) and bedridden (e.g., nursing home patient, stroke, chronic illness, recovering from surgery)    Negative: Fever present > 3 days (72 hours)    Negative: Fever returns after gone for over 24 hours and symptoms worse or not improved    Negative: Sinus pain (not just congestion) and fever    Protocols used: COMMON COLD-A-[place]

## 2021-11-18 NOTE — PATIENT INSTRUCTIONS
"You were seen today for a cough. This is likely due to a virus and will improve over the next 1-2 weeks on its own.    Symptom management:  - Drink plenty of non-caffeinated fluids  - Avoid smoke exposure  - May use tylenol or ibuprofen for discomfort  - Drink a warm non-caffeinated tea with honey  - Place a warm humidifier in your bedroom at night  - [employer]    Reasons to return for re-evaluation:  - Develop a fever 100.4 or higher, current fever worsens, or fever does not improve in 72 hours  - Difficulty breathing or shortness of breath  - Cough continues to worsen including coughing up blood or coughing up thick, colored phlegm  - Unable to tolerate fluids    Otherwise, if symptoms have not improved in 7 days, follow-up with your primary care provider.    Discharge Instructions for COVID-19 Patients  You have--or may have--COVID-19. Please follow the instructions listed below.   If you have a weakened immune system, discuss with your doctor any other actions you need to take.  How can I protect others?  If you have symptoms (fever, cough, body aches or trouble breathing):    Stay home and away from others (self-isolate) until:  ? Your other symptoms have resolved (gotten better). And   ? You've had no fever--and no medicine that reduces fever--for 1 full day (24 hours). And   ? At least 10 days have passed since your symptoms started. (You may need to wait 20 days. Follow the advice of your care team.)  If you don't show symptoms, but testing showed that you have COVID-19:    Stay home and away from others (self-isolate) until at least 10 days have passed since the date of your first positive COVID-19 test.  During this time    Stay in your own room, even for meals. Use your own bathroom if you can.    Stay away from others in your home. No hugging, kissing or shaking hands. No visitors.    Don't go to work, school or anywhere else.    Clean \"high touch\" surfaces often (doorknobs, counters, handles). Use " household cleaning spray or wipes.    You'll find a full list of  on the EPA website: www.epa.gov/pesticide-registration/list-n-disinfectants-use-against-sars-cov-2.    Cover your mouth and nose with a mask or other face covering to avoid spreading germs.    Wash your hands and face often. Use soap and water.    Caregivers in these groups are at risk for severe illness due to COVID-19:  ? People 65 years and older  ? People who live in a nursing home or long-term care facility  ? People with chronic disease (lung, heart, cancer, diabetes, kidney, liver, immunologic)  ? People who have a weakened immune system, including those who:    Are in cancer treatment    Take medicine that weakens the immune system, such as corticosteroids    Had a bone marrow or organ transplant    Have an immune deficiency    Have poorly controlled HIV or AIDS    Are obese (body mass index of 40 or higher)    Smoke regularly    Caregivers should wear gloves while washing dishes, handling laundry and cleaning bedrooms and bathrooms.    Use caution when washing and drying laundry: Don't shake dirty laundry and use the warmest water setting that you can.    For more tips on managing your health at home, go to www.cdc.gov/coronavirus/2019-ncov/downloads/10Things.pdf.  How can I take care of myself at home?  1. Get lots of rest. Drink extra fluids (unless a doctor has told you not to).  2. Take Tylenol (acetaminophen) for fever or pain. If you have liver or kidney problems, ask your family doctor if it's okay to take Tylenol.   Adults can take either:   ? 650 mg (two 325 mg pills) every 4 to 6 hours, or   ? 1,000 mg (two 500 mg pills) every 8 hours as needed.  ? Note: Don't take more than 3,000 mg in one day. Acetaminophen is found in many medicines (both prescribed and over-the-counter medicines). Read all labels to be sure you don't take too much.   For children, check the Tylenol bottle for the right dose. The dose is based on the  child's age or weight.  3. If you have other health problems (like cancer, heart failure, an organ transplant or severe kidney disease): Call your specialty clinic if you don't feel better in the next 2 days.  4. Know when to call 911. Emergency warning signs include:  ? Trouble breathing or shortness of breath  ? Pain or pressure in the chest that doesn't go away  ? Feeling confused like you haven't felt before, or not being able to wake up  ? Bluish-colored lips or face  5. Your doctor may have prescribed a blood thinner medicine. Follow their instructions.  Where can I get more information?    [facility] - About COVID-19:   [email or web address]    CDC - What to Do If You're Sick: www.cdc.gov/coronavirus/2019-ncov/about/steps-when-sick.html    [facility] - Ending Home Isolation: www.cdc.gov/coronavirus/2019-ncov/hcp/disposition-in-home-patients.html    [facility] - Caring for Someone: www.cdc.gov/coronavirus/2019-ncov/if-you-are-sick/care-for-someone.html    [facility] - Interim Guidance for Hospital Discharge to Home: www.health.[facility].mn.us/diseases/coronavirus/hcp/hospdischarge.pdf    Below are the COVID-19 hotlines at the [facility] ([facility]). Interpreters are available.  ? For health questions: Call 780-495-8284 or 1-887.931.5823 (7 a.m. to 7 p.m.)  ? For questions about schools and childcare: Call 713-876-2787 or 1-371.815.5844 (7 a.m. to 7 p.m.)    For informational purposes only. Not to replace the advice of your health care provider. Clinically reviewed by Dr. Darek Melton.   Copyright   2020 [employer]. All rights reserved. [employer] [id number] - REV 01/05/21.

## 2021-11-18 NOTE — PROGRESS NOTES
Assessment & Plan:      Problem List Items Addressed This Visit     None      Visit Diagnoses     Cough    -  Primary    Relevant Orders    Symptomatic COVID-19 Virus (Coronavirus) by PCR Nose (Completed)    Symptomatic COVID-19 Virus (Coronavirus) by PCR Nose    Throat pain        Relevant Orders    Streptococcus A Rapid Screen w/Reflex to PCR - Clinic Collect (Completed)    Group A Streptococcus PCR Throat Swab (Completed)        Medical Decision Making  Patient presents with acute onset cough and sore throat.  Rapid strep is negative.  Suspect likely viral upper respiratory infection.  There is in process COVID-19.  No signs of otitis media on exam.  Discussed self-isolation and prevention of spreading illness.  Continue with fluids, honey, and over-the-counter analgesics as needed.  Discussed signs of worsening symptoms and when to follow-up with PCP if no symptom improvement.     Subjective:      Federico Bright is a 26 year old male here for evaluation of cough and sore throat.  Onset of symptoms was 2 to 3 days ago.  Associated symptoms include bilateral ear pains and fevers of 102 max.  Patient has noted some improvement of fevers already.  He otherwise denies shortness of breath.  Patient has been previously vaccinated for COVID-19.     The following portions of the patient's history were reviewed and updated as appropriate: allergies, current medications, and problem list.     Review of Systems  Pertinent items are noted in HPI.    Allergies  No Known Allergies    Family History   Problem Relation Age of Onset     Cancer Maternal Grandmother      Cancer Maternal Grandfather      Cancer Paternal Grandmother      Cancer Paternal Grandfather        Social History     Tobacco Use     Smoking status: Never Smoker     Smokeless tobacco: Never Used   Substance Use Topics     Alcohol use: No        Objective:      /72   Pulse 91   Temp 99.2  F (37.3  C) (Oral)   Resp 16   Wt 117.5 kg (259 lb)    SpO2 97%   BMI 34.17 kg/m    General appearance - alert, well appearing, and in no distress and non-toxic  Ears - TMs intact with mild mucoid fluid and bulging bilaterally, no significant erythema, external ears normal  Nose - normal and patent, no erythema, discharge or polyps  Mouth - Posterior pharynx is mildly erythematous, tonsils previously removed, no exudate, mucous membranes moist, lips and tongue normal  Neck - mild bilateral anterior cervical lymphadenopathy  Chest - clear to auscultation, no wheezes, rales or rhonchi, symmetric air entry  Heart - normal rate, regular rhythm, normal S1, S2, no murmurs, rubs, clicks or gallops     Lab & Imaging Results    Results for orders placed or performed in visit on 11/18/21 (from the past 24 hour(s))   Symptomatic COVID-19 Virus (Coronavirus) by PCR Nose    Specimen: Nose; Swab   Result Value Ref Range    SARS CoV2 PCR  Negative, Testing sent to reference lab. Results will be returned via unsolicited result     Testing sent to reference lab. Results will be returned via unsolicited result   Streptococcus A Rapid Screen w/Reflex to PCR - Clinic Collect    Specimen: Throat; Swab   Result Value Ref Range    Group A Strep antigen Negative Negative   Group A Streptococcus PCR Throat Swab    Specimen: Throat; Swab   Result Value Ref Range    Group A strep by PCR Not Detected Not Detected    Narrative    The Xpert Xpress Strep A test, performed on the [employer], is a rapid, qualitative in vitro diagnostic test for the detection of Streptococcus pyogenes (Group A ß-hemolytic Streptococcus, Strep A) in throat swab specimens from patients with signs and symptoms of pharyngitis. The Xpert Xpress Strep A test can be used as an aid in the diagnosis of Group A Streptococcal pharyngitis. The assay is not intended to monitor treatment for Group A Streptococcus infections. The Xpert Xpress Strep A test utilizes an automated real-time polymerase chain reaction  (PCR) to detect Streptococcus pyogenes DNA.       I personally reviewed these results and discussed findings with the patient.    The use of Dragon/PowerMic dictation services was used to construct the content of this note; any grammatical errors are non-intentional. Please contact the author directly if you are in need of any clarification.

## 2021-11-19 ENCOUNTER — MYC MEDICAL ADVICE (OUTPATIENT)
Dept: FAMILY MEDICINE | Facility: CLINIC | Age: 26
End: 2021-11-19
Payer: COMMERCIAL

## 2021-11-19 LAB
GROUP A STREP BY PCR: NOT DETECTED
SARS-COV-2 RNA RESP QL NAA+PROBE: NORMAL
SARS-COV-2 RNA RESP QL NAA+PROBE: NOT DETECTED

## 2021-11-23 ENCOUNTER — NURSE TRIAGE (OUTPATIENT)
Dept: NURSING | Facility: CLINIC | Age: 26
End: 2021-11-23
Payer: COMMERCIAL

## 2021-11-23 NOTE — TELEPHONE ENCOUNTER
Pt calling back, checking status, as he still has questions and wants to be sure lab only visit is ok.

## 2021-11-24 ENCOUNTER — LAB (OUTPATIENT)
Dept: LAB | Facility: CLINIC | Age: 26
End: 2021-11-24
Payer: COMMERCIAL

## 2021-11-24 ENCOUNTER — OFFICE VISIT (OUTPATIENT)
Dept: FAMILY MEDICINE | Facility: CLINIC | Age: 26
End: 2021-11-24
Payer: COMMERCIAL

## 2021-11-24 VITALS
TEMPERATURE: 98.1 F | DIASTOLIC BLOOD PRESSURE: 70 MMHG | HEART RATE: 80 BPM | OXYGEN SATURATION: 98 % | SYSTOLIC BLOOD PRESSURE: 112 MMHG | WEIGHT: 262 LBS | BODY MASS INDEX: 34.57 KG/M2 | RESPIRATION RATE: 18 BRPM

## 2021-11-24 DIAGNOSIS — K21.9 GASTROESOPHAGEAL REFLUX DISEASE WITHOUT ESOPHAGITIS: Primary | ICD-10-CM

## 2021-11-24 DIAGNOSIS — R10.13 EPIGASTRIC PAIN: ICD-10-CM

## 2021-11-24 DIAGNOSIS — R79.89 ELEVATED LFTS: ICD-10-CM

## 2021-11-24 LAB
ALBUMIN SERPL-MCNC: 3.9 G/DL (ref 3.5–5)
ALP SERPL-CCNC: 222 U/L (ref 45–120)
ALT SERPL W P-5'-P-CCNC: 386 U/L (ref 0–45)
AST SERPL W P-5'-P-CCNC: 198 U/L (ref 0–40)
BILIRUB DIRECT SERPL-MCNC: 0.8 MG/DL
BILIRUB SERPL-MCNC: 1.8 MG/DL (ref 0–1)
PROT SERPL-MCNC: 7 G/DL (ref 6–8)

## 2021-11-24 PROCEDURE — 80076 HEPATIC FUNCTION PANEL: CPT

## 2021-11-24 PROCEDURE — 86709 HEPATITIS A IGM ANTIBODY: CPT

## 2021-11-24 PROCEDURE — 99213 OFFICE O/P EST LOW 20 MIN: CPT | Performed by: NURSE PRACTITIONER

## 2021-11-24 PROCEDURE — 87340 HEPATITIS B SURFACE AG IA: CPT

## 2021-11-24 PROCEDURE — 36415 COLL VENOUS BLD VENIPUNCTURE: CPT

## 2021-11-24 PROCEDURE — 86803 HEPATITIS C AB TEST: CPT

## 2021-11-24 ASSESSMENT — ENCOUNTER SYMPTOMS
FEVER: 0
CHILLS: 0

## 2021-11-24 NOTE — PROGRESS NOTES
"Assessment & Plan     Epigastric pain      Gastroesophageal reflux disease without esophagitis       2 episodes of typical GERD in the last 2 days.  Restart Maalox.  Lifestyle adjustments handout discussed.    Avoid citrus for now.    PCP ordered upper abdomen lab work-up which is pending.  This was done earlier at the [facility].  PCP will follow-up if any of these are abnormal.  Does not sound like gallbladder disease.     Patient can self start Prilosec if having repeated episodes this week or if he averages 2 or 3 episodes per week for several weeks.      Follow-up on November 30 as planned.          No follow-ups on file.    Darcy Do, CNP  [facility]    Subjective     Federico is a 26 year old male who presents to clinic today for the following health issues:  Chief Complaint   Patient presents with     Abdominal Pain     stomach pain medicines not working had follow up lab test today      HPI    Epigastric pain shooting up to chest and arms if \"really bad.\"  Has been getting occasionally for months.     Occasional vomiting.     Sometimes will be associated with thirst, dark orange urine urine and pale stools. No dark orange urine now.      No pain now.      Last pain for last night x 3-4 hours. Had similar night before.  Prior to that, previous episode was around October 26 and then prior to that more than a month before.  Ate at 6 PM on the day of symptoms.  Seems to be associated with lemonade.    Didn't take anything - has tried Maalox, TUMS -did not think these were effective.    Pain usually in the middle.      Recent viral URI.        Review of Systems   Constitutional: Negative for chills and fever.           Objective    /70   Pulse 80   Temp 98.1  F (36.7  C) (Oral)   Resp 18   Wt 118.8 kg (262 lb)   SpO2 98%   BMI 34.57 kg/m    Physical Exam  Constitutional:       Appearance: He is well-developed.   HENT:      Right Ear: External ear " normal.      Left Ear: External ear normal.   Eyes:      General:         Right eye: No discharge.         Left eye: No discharge.      Conjunctiva/sclera: Conjunctivae normal.   Pulmonary:      Effort: Pulmonary effort is normal.   Abdominal:      General: Abdomen is flat. Bowel sounds are normal. There is no distension.      Palpations: Abdomen is soft.      Tenderness: There is no abdominal tenderness.   Musculoskeletal:         General: Normal range of motion.   Skin:     General: Skin is warm.   Neurological:      Mental Status: He is alert and oriented to person, place, and time.   Psychiatric:         Mood and Affect: Mood normal.         Behavior: Behavior normal.         Thought Content: Thought content normal.         Judgment: Judgment normal.            No results found for this or any previous visit (from the past 24 hour(s)).

## 2021-11-24 NOTE — PATIENT INSTRUCTIONS
If having more than 2-3 episodes like this per week, you need a daily stomach acid medicine : Prilosec 20 mg daily (over the counter or generic  Omeprazole).     For now, see handout, lifestyle adjustment, avoid citrus,     Try Maalox or similar if comes back.      Patient Education     Tips to Control Acid Reflux    To control acid reflux, you ll need to make some basic diet and lifestyle changes. The simple steps outlined below may be all you ll need to ease discomfort.   Watch what you eat    Don't have fatty foods or spicy foods.    Eat fewer acidic foods, such as citrus and tomato-based foods. These can increase symptoms.    Limit drinking alcohol, caffeine, and fizzy beverages. All increase acid reflux.    Try limiting chocolate, peppermint, and spearmint. These can make acid reflux worse in some people.    Watch when you eat    Don't lie down for 3 hours after eating.    Don't snack before going to bed.    Raise your head  Raising your head and upper body by 4 to 6 inches helps limit reflux when you re lying down. Put blocks under the head of your bed frame or a wedge under your mattress to raise it.   Other changes    Lose weight, if you need to    Don t exercise near bedtime    Don't wear tight-fitting clothes    Limit aspirin and ibuprofen    Stop smoking    [employer] last reviewed this educational content on 6/1/2019 2000-2021 [employer]. All rights reserved. This information is not intended as a substitute for professional medical care. Always follow your healthcare professional's instructions.           Patient Education     Tips to Control Acid Reflux    To control acid reflux, you ll need to make some basic diet and lifestyle changes. The simple steps outlined below may be all you ll need to ease discomfort.   Watch what you eat    Don't have fatty foods or spicy foods.    Eat fewer acidic foods, such as citrus and tomato-based foods. These can increase symptoms.    Limit drinking  alcohol, caffeine, and fizzy beverages. All increase acid reflux.    Try limiting chocolate, peppermint, and spearmint. These can make acid reflux worse in some people.    Watch when you eat    Don't lie down for 3 hours after eating.    Don't snack before going to bed.    Raise your head  Raising your head and upper body by 4 to 6 inches helps limit reflux when you re lying down. Put blocks under the head of your bed frame or a wedge under your mattress to raise it.   Other changes    Lose weight, if you need to    Don t exercise near bedtime    Don't wear tight-fitting clothes    Limit aspirin and ibuprofen    Stop smoking    [employer] last reviewed this educational content on 6/1/2019 2000-2021 [employer]. All rights reserved. This information is not intended as a substitute for professional medical care. Always follow your healthcare professional's instructions.

## 2021-11-24 NOTE — TELEPHONE ENCOUNTER
"\"I have had severe stomach pain over the past day or 2. I have had a couple of episodes. I attributed it to heartburn. Antacids (generic [employer] brand TUMS) don't seem to help. The next day I had dark orange urine and my stool was pale. The first time it happened =10/25, the second time was yesterday. The urine color and stool color return to normal after a day or so.  I also had vomiting after eating \"Thanksgiving food\" and artificial lemonade, 8pm yesterday and 6:30am today. It was kind of red. I don't know if it was blood. It was pinkish\". No yellow color to the whites of his eyes or skin noted by patient.   Abdominal pain is located \"just below the ribcage in the middle. Sometimes it spreads up into the chest a little bit and into my arms. Currently located in the upper abdomen=\"5\". I have not taken anything for the pain. When the pain gets worse=\"10\" it makes me sweat. It was last =10 on 10/25.   The first time it happened was Mar or Apr and I called 911 but they did not bring me to the ER. I did not see my primary MD at that time. I saw the BASIL Kumar NP in October and lab work was ordered. More lab tests are scheduled iris be done tomorrow\".   11:11pm Now abdominal pain=\"2-3\", then he said \"almost gone now\".     Triaged to a disposition of see provider within 24 hrs. Call transferred to .    Susu Tafoya RN Triage Nurse Advisor 11:27 PM 11/23/2021  Reason for Disposition    [1] MODERATE pain (e.g., interferes with normal activities) AND [2] comes and goes (cramps) AND [3] present > 24 hours  (Exception: pain with Vomiting or Diarrhea - see that Guideline)    Additional Information    Negative: Severe difficulty breathing (e.g., struggling for each breath, speaks in single words)    Negative: Shock suspected (e.g., cold/pale/clammy skin, too weak to stand, low BP, rapid pulse)    Negative: Difficult to awaken or acting confused (e.g., disoriented, slurred speech)    Negative: Passed out (i.e., lost " "consciousness, collapsed and was not responding)    Negative: Visible sweat on face or sweat dripping down face    Negative: Sounds like a life-threatening emergency to the triager    Negative: Followed an abdomen (stomach) injury    Negative: Chest pain    Negative: [1] SEVERE pain (e.g., excruciating) AND [2] present > 1 hour    Negative: [1] Pain lasts > 10 minutes AND [2] age > 50    Negative: [1] Pain lasts > 10 minutes AND [2] age > 40 AND [3] associated chest, arm, neck, upper back or jaw pain    Negative: [1] Pain lasts > 10 minutes AND [2] age > 35 AND [3] at least one cardiac risk factor (i.e., hypertension, diabetes, obesity, smoker or strong family history of heart disease)    Negative: [1] Pain lasts > 10 minutes AND [2] history of heart disease (i.e., heart attack, bypass surgery, angina, angioplasty, CHF; not just a heart murmur)    Negative: [1] Pain lasts > 10 minutes AND [2] difficulty breathing    Negative: [1] Vomiting AND [2] contains red blood  (Exception: few streaks and only occurred once)    Negative: [1] Vomiting AND [2] contains black (\"coffee ground\") material    Negative: Blood in bowel movements  (Exception: Blood on surface of BM with constipation)    Negative: Black or tarry bowel movements  (Exception: chronic-unchanged  black-grey bowel movements AND is taking iron pills or Pepto-bismol)    Negative: [1] Pregnant > 24 weeks AND [2] hand or face swelling    Negative: Patient sounds very sick or weak to the triager    Negative: [1] MILD-MODERATE pain AND [2] constant AND [3] present > 2 hours    Negative: [1] MILD-MODERATE pain AND [2] not relieved by antacids    Negative: [1] Vomiting AND [2] contains bile (green color)    Negative: [1] Vomiting AND [2] abdomen looks much more swollen than usual    Negative: White of the eyes have turned yellow (i.e., jaundice)    Negative: Fever > 103 F (39.4 C)    Negative: [1] Fever > 101 F (38.3 C) AND [2] age > 60    Negative: [1] Fever > 100.0 " F (37.8 C) AND [2] bedridden (e.g., nursing home patient, CVA, chronic illness, recovering from surgery)    Negative: [1] Fever > 100.0 F (37.8 C) AND [2] diabetes mellitus or weak immune system (e.g., HIV positive, cancer chemo, splenectomy, organ transplant, chronic steroids)    Protocols used: ABDOMINAL PAIN - UPPER-A-AH  COVID 19 Nurse Triage Plan/Patient Instructions    Please be aware that novel coronavirus (COVID-19) may be circulating in the community. If you develop symptoms such as fever, cough, or SOB or if you have concerns about the presence of another infection including coronavirus (COVID-19), please contact your health care provider or visit [email or web address].     Disposition/Instructions    In-Person Visit with provider recommended. Reference Visit Selection Guide.    Thank you for taking steps to prevent the spread of this virus.  o Limit your contact with others.  o Wear a simple mask to cover your cough.  o Wash your hands well and often.    Resources    [facility]: About COVID-19: [email or web address]    CDC: What to Do If You're Sick: www.cdc.gov/coronavirus/2019-ncov/about/steps-when-sick.html    CDC: Ending Home Isolation: www.cdc.gov/coronavirus/2019-ncov/hcp/disposition-in-home-patients.html     CDC: Caring for Someone: www.cdc.gov/coronavirus/2019-ncov/if-you-are-sick/care-for-someone.html     [facility]: Interim Guidance for Hospital Discharge to Home: www.health.[facility].[place]/diseases/coronavirus/hcp/hospdischarge.pdf    [facility] clinical trials (COVID-19 research studies): clinicalaffairs.[facility]/umn-clinical-trials     Below are the COVID-19 hotlines at the [place] Department of Health ([facility]). Interpreters are available.   o For health questions: Call 740-321-4360 or 1-339.880.7695 (7 a.m. to 7 p.m.)  o For questions about schools and childcare: Call 925-539-5969 or 1-531.143.2687 (7 a.m. to 7 p.m.)

## 2021-11-25 ENCOUNTER — TELEPHONE (OUTPATIENT)
Dept: NURSING | Facility: CLINIC | Age: 26
End: 2021-11-25
Payer: COMMERCIAL

## 2021-11-25 LAB — HBV SURFACE AG SERPL QL IA: NONREACTIVE

## 2021-11-25 NOTE — TELEPHONE ENCOUNTER
Patient was seen in clinic for heartburn symptoms, dark orange urine and pale stool. He had labs done. Currently the Hepatic panel is back and patient is looking for clarification on results.    Dr. Walters paged at 2:10 PM. Hepatic panel is reviewed with provider. Per Dr. Walters nothing needs to be done with results today, patient should expect to have further testing ordered, imaging included, to look at the liver, gallbladder, and pancreas.    Return call placed to patient regarding lab results after speaking with Dr. Walters. Patient verbalizes understanding and denies further questions at this time.     Message will be sent to Chuy Kumar so he is aware that patient was calling regarding labs.    Daxa Justice RN  [facility]

## 2021-11-26 DIAGNOSIS — R79.89 ELEVATED LFTS: Primary | ICD-10-CM

## 2021-11-26 LAB
HAV IGM SERPL QL IA: NEGATIVE
HCV AB SERPL QL IA: NEGATIVE

## 2021-11-28 ENCOUNTER — HOSPITAL ENCOUNTER (OUTPATIENT)
Dept: ULTRASOUND IMAGING | Facility: CLINIC | Age: 26
Discharge: HOME | End: 2021-11-28
Attending: NURSE PRACTITIONER | Admitting: NURSE PRACTITIONER
Payer: COMMERCIAL

## 2021-11-28 DIAGNOSIS — R79.89 ELEVATED LFTS: ICD-10-CM

## 2021-11-28 PROCEDURE — 76705 ECHO EXAM OF ABDOMEN: CPT

## 2021-11-30 ENCOUNTER — OFFICE VISIT (OUTPATIENT)
Dept: FAMILY MEDICINE | Facility: CLINIC | Age: 26
End: 2021-11-30
Payer: COMMERCIAL

## 2021-11-30 VITALS
OXYGEN SATURATION: 96 % | DIASTOLIC BLOOD PRESSURE: 70 MMHG | HEIGHT: 73 IN | SYSTOLIC BLOOD PRESSURE: 116 MMHG | BODY MASS INDEX: 33.93 KG/M2 | WEIGHT: 256 LBS | HEART RATE: 99 BPM

## 2021-11-30 DIAGNOSIS — Z23 NEEDS FLU SHOT: ICD-10-CM

## 2021-11-30 DIAGNOSIS — K76.9 HEPATIC LESION: ICD-10-CM

## 2021-11-30 DIAGNOSIS — K80.20 CALCULUS OF GALLBLADDER WITHOUT CHOLECYSTITIS WITHOUT OBSTRUCTION: Primary | ICD-10-CM

## 2021-11-30 PROCEDURE — 90471 IMMUNIZATION ADMIN: CPT | Performed by: NURSE PRACTITIONER

## 2021-11-30 PROCEDURE — 99214 OFFICE O/P EST MOD 30 MIN: CPT | Mod: 25 | Performed by: NURSE PRACTITIONER

## 2021-11-30 PROCEDURE — 90686 IIV4 VACC NO PRSV 0.5 ML IM: CPT | Performed by: NURSE PRACTITIONER

## 2021-11-30 ASSESSMENT — MIFFLIN-ST. JEOR: SCORE: 2185.09

## 2021-11-30 NOTE — PATIENT INSTRUCTIONS
I went ahead and order the MRI of the liver to further evaluate the spot.    Radiology scheduling should be contacting you, but their number is [phone] if you don't hear anything.    I also went ahead and place the referral to general surgery.  They will take a look at your scans and help us to determine plan of care.    Flu shot given today.

## 2021-12-01 NOTE — PROGRESS NOTES
"Chief Complaint   Patient presents with     Results     Would like to go over abdomen US.        HPI: Patient presents today to go over test results.  When I last saw the patient in October he had epigastric discomfort.  Labs were performed which showed elevation in liver enzymes.  He missed his follow-up lab only appointment and labs were done within the last week showing a mild rise in LFTs.  Patient has not had no further pain.  Bowel movements are soft, regular, and light brown.  No further epigastric tenderness.  No bloating or distention.  He has been trying to eat healthier lately.  No new vitamins, supplements, medications around the time of the elevated labs.  Ultrasound of the liver did also show a 1.7 cm lesion on the right hepatic lobe with cholelithiasis and sludge.    Today patient feels great.  No weakness, nausea, jaundice, fatigue, appetite changes, pain.    ROS:Review of Systems - negative except for what's listed in the HPI    SH: The Patient's  reports that he has never smoked. He has never used smokeless tobacco. He reports that he does not drink alcohol and does not use drugs.      FH: The Patient's family history includes Cancer in his maternal grandfather, maternal grandmother, paternal grandfather, and paternal grandmother.     Meds:    Current Outpatient Medications   Medication     escitalopram (LEXAPRO) 20 MG tablet     No current facility-administered medications for this visit.       O:  /70   Pulse 99   Ht 1.854 m (6' 1\")   Wt 116.1 kg (256 lb)   SpO2 96%   BMI 33.78 kg/m      Physical Examination:   General appearance - alert, well appearing, and in no distress  Mouth - mucous membranes moist. No oral lesions.  Neck - no significant adenopathy  Lymphatics - no palpable lymphadenopathy  Chest - clear to auscultation, no wheezes, rales or rhonchi, symmetric air entry  Heart - normal rate and regular rhythm, S1 and S2 normal, no murmurs noted  Abdomen - soft, nontender, " nondistended, no masses or organomegaly  Extremities - peripheral pulses normal, no peripheral edema  Skin - normal coloration and turgor.      A/P:       ICD-10-CM    1. Calculus of gallbladder without cholecystitis without obstruction  K80.20 Adult General Surg Referral   2. Hepatic lesion  K76.9 MR Liver wo & w Contrast   3. Needs flu shot  Z23 INFLUENZA VACCINE IM >6 MO VALENT IIV4 (AFLURIA/FLUZONE)     Abnormally elevated LFTs.  Normal hepatic panel.  Gallbladder does have stones and sludge which could explain his epigastric pain, however his LFTs have not recovered as I would anticipate.  Start with hepatic MRI to further evaluate this lesion and liver structure and meet with general surgery to see if they would think a cholecystectomy would be beneficial.  Low threshold for consulting GI if surgery doesn't think this is a gallbladder issue.  Alcohol use is rare but I encouraged no alcohol right now.  No over-the-counter acetaminophen.      Calculus of gallbladder without cholecystitis without obstruction  - Adult General Surg Referral    Hepatic lesion  - MR Liver wo & w Contrast    Needs flu shot  - INFLUENZA VACCINE IM >6 MO VALENT IIV4 (AFLURIA/FLUZONE)        Chuy Kumar, CNP      This note has been dictated using voice recognition software. Any grammatical or context distortions are unintentional and inherent to the software.

## 2021-12-08 ENCOUNTER — TELEPHONE (OUTPATIENT)
Dept: SURGERY | Facility: CLINIC | Age: 26
End: 2021-12-08

## 2021-12-08 ENCOUNTER — OFFICE VISIT (OUTPATIENT)
Dept: SURGERY | Facility: CLINIC | Age: 26
End: 2021-12-08
Attending: NURSE PRACTITIONER
Payer: COMMERCIAL

## 2021-12-08 VITALS — SYSTOLIC BLOOD PRESSURE: 118 MMHG | WEIGHT: 252 LBS | DIASTOLIC BLOOD PRESSURE: 70 MMHG | BODY MASS INDEX: 33.25 KG/M2

## 2021-12-08 DIAGNOSIS — K80.20 CALCULUS OF GALLBLADDER WITHOUT CHOLECYSTITIS WITHOUT OBSTRUCTION: ICD-10-CM

## 2021-12-08 PROCEDURE — 99204 OFFICE O/P NEW MOD 45 MIN: CPT | Performed by: SURGERY

## 2021-12-08 RX ORDER — CEFAZOLIN SODIUM 2 G/100ML
2 INJECTION, SOLUTION INTRAVENOUS SEE ADMIN INSTRUCTIONS
Status: CANCELLED | OUTPATIENT
Start: 2022-01-04

## 2021-12-08 RX ORDER — CEFAZOLIN SODIUM 2 G/100ML
2 INJECTION, SOLUTION INTRAVENOUS
Status: CANCELLED | OUTPATIENT
Start: 2022-01-04

## 2021-12-08 NOTE — LETTER
12/8/2021         RE: Federico Bright  [address] [id number]        Dear Colleague,    Thank you for referring your patient, Federico Bright, to the [facility] [place]. Please see a copy of my visit note below.    I was consulted by Lloyd Marie MD and Chuy Kumar CNP to evaluate this patients gall bladder.    HPI: Federico Bright is a 26 year old male who has been experiencing some problems with epigastric pain. he has been noting this for about 8 months. It has been occurring sporadically.  He has had a few episodes of dark urine and jerrell colored stool.  It is not associated with nausea or vomiting.      Allergies:Patient has no known allergies.    Past Medical History:   Diagnosis Date     Anxiety      Depression      Nasal bones, closed fracture     Created by Conversion        Past Surgical History:   Procedure Laterality Date     OTHER SURGICAL HISTORY      tonsils     WISDOM TOOTH EXTRACTION         CURRENT MEDS:    Current Outpatient Medications:      escitalopram (LEXAPRO) 20 MG tablet, TAKE 1 TABLET(20 MG) BY MOUTH DAILY, Disp: 90 tablet, Rfl: 0    Family History   Problem Relation Age of Onset     Cancer Maternal Grandmother      Cancer Maternal Grandfather      Cancer Paternal Grandmother      Cancer Paternal Grandfather         reports that he has never smoked. He has never used smokeless tobacco. He reports that he does not drink alcohol and does not use drugs.    Review of Systems:  10 system were reviewed and all are within normal limits except for those listed in the HPI.      EXAM:  /70   Wt 114.3 kg (252 lb)   BMI 33.25 kg/m    GENERAL: Well developed male   EYES: Anicteric Sclera,  EOMI  CARDIAC: RRR w/out murmur  CHEST/LUNG: Clear to ascultation, No wheezes  ABDOMEN: Soft with, +Bowel Sounds  NEURO:No focal deficits, ambulatory  LYMPH: No Axillary or inguinal Adenopathy  EXTREMITIES: Ambulatory, No lower  extremity deformities      LABS:  Lab Results   Component Value Date    WBC 10.0 04/26/2020    HGB 13.4 04/26/2020    HCT 39.4 04/26/2020    MCV 88 04/26/2020     04/26/2020     INR/Prothrombin Time  @LABRCNTIP(NA,K,CL,co2,bun,creatinine,labglom,glucose,calcium)@  Lab Results   Component Value Date     (H) 11/24/2021     (H) 11/24/2021    ALKPHOS 222 (H) 11/24/2021       IMAGES:   I reviewed the US and see the presence of Gall Stones  EXAM: ULTRASOUND ABDOMEN LIMITED  LOCATION: [facility]  DATE/TIME: 11/28/2021, 12:28 PM     INDICATION: Elevated LFTs.  COMPARISON: None.  TECHNIQUE: Limited abdominal ultrasound.     FINDINGS:     GALLBLADDER: Stones and sludge are present within the gallbladder. There is no Delacruz's sign.     BILE DUCTS: No biliary dilatation. The common duct measures 5 mm.     LIVER: Within the right hepatic lobe, there is a 1.7 x 1.6 x 1.5 cm heterogeneous hypoechoic lesion. No focal mass.     RIGHT KIDNEY: No hydronephrosis.     PANCREAS: The visualized portions are normal.     No ascites.                                                                      IMPRESSION:  1.  Cholelithiasis and gallbladder sludge without acute cholecystitis.  2.  1.7 cm heterogeneous hypoechoic lesion within the right hepatic lobe. This is indeterminate. Liver MRI is recommended for further evaluation.    Assessment/Plan: Pt with signs and symptoms consistent with chronic cholecystitis. I have recommended a cholecystectomy. I discussed with him the plan to do this laparoscopically understanding the possibility of needing to convert to an open operation. I went over some of the risks of surgery including but not limited to bleeding, infection and bile duct injury. I also discussed the outpatient nature of the surgery and the expected recovery time.         Daniel Espinosa MD  [phone]  [facility], Department of Surgery      Again, thank you for  allowing me to participate in the care of your patient.        Sincerely,        Daniel Espinosa MD

## 2021-12-08 NOTE — H&P (VIEW-ONLY)
I was consulted by Lloyd Marie MD and Chuy Kumar CNP to evaluate this patients gall bladder.    HPI: Federico Bright is a 26 year old male who has been experiencing some problems with epigastric pain. he has been noting this for about 8 months. It has been occurring sporadically.  He has had a few episodes of dark urine and jerrell colored stool.  It is not associated with nausea or vomiting.      Allergies:Patient has no known allergies.    Past Medical History:   Diagnosis Date     Anxiety      Depression      Nasal bones, closed fracture     Created by Conversion        Past Surgical History:   Procedure Laterality Date     OTHER SURGICAL HISTORY      tonsils     WISDOM TOOTH EXTRACTION         CURRENT MEDS:    Current Outpatient Medications:      escitalopram (LEXAPRO) 20 MG tablet, TAKE 1 TABLET(20 MG) BY MOUTH DAILY, Disp: 90 tablet, Rfl: 0    Family History   Problem Relation Age of Onset     Cancer Maternal Grandmother      Cancer Maternal Grandfather      Cancer Paternal Grandmother      Cancer Paternal Grandfather         reports that he has never smoked. He has never used smokeless tobacco. He reports that he does not drink alcohol and does not use drugs.    Review of Systems:  10 system were reviewed and all are within normal limits except for those listed in the HPI.      EXAM:  /70   Wt 114.3 kg (252 lb)   BMI 33.25 kg/m    GENERAL: Well developed male   EYES: Anicteric Sclera,  EOMI  CARDIAC: RRR w/out murmur  CHEST/LUNG: Clear to ascultation, No wheezes  ABDOMEN: Soft with, +Bowel Sounds  NEURO:No focal deficits, ambulatory  LYMPH: No Axillary or inguinal Adenopathy  EXTREMITIES: Ambulatory, No lower extremity deformities      LABS:  Lab Results   Component Value Date    WBC 10.0 04/26/2020    HGB 13.4 04/26/2020    HCT 39.4 04/26/2020    MCV 88 04/26/2020     04/26/2020     INR/Prothrombin Time  @LABRCNTIP(NA,K,CL,co2,bun,creatinine,labglom,glucose,calcium)@  Lab Results    Component Value Date     (H) 11/24/2021     (H) 11/24/2021    ALKPHOS 222 (H) 11/24/2021       IMAGES:   I reviewed the US and see the presence of Gall Stones  EXAM: ULTRASOUND ABDOMEN LIMITED  LOCATION: [facility]  DATE/TIME: 11/28/2021, 12:28 PM     INDICATION: Elevated LFTs.  COMPARISON: None.  TECHNIQUE: Limited abdominal ultrasound.     FINDINGS:     GALLBLADDER: Stones and sludge are present within the gallbladder. There is no Delacruz's sign.     BILE DUCTS: No biliary dilatation. The common duct measures 5 mm.     LIVER: Within the right hepatic lobe, there is a 1.7 x 1.6 x 1.5 cm heterogeneous hypoechoic lesion. No focal mass.     RIGHT KIDNEY: No hydronephrosis.     PANCREAS: The visualized portions are normal.     No ascites.                                                                      IMPRESSION:  1.  Cholelithiasis and gallbladder sludge without acute cholecystitis.  2.  1.7 cm heterogeneous hypoechoic lesion within the right hepatic lobe. This is indeterminate. Liver MRI is recommended for further evaluation.    Assessment/Plan: Pt with signs and symptoms consistent with chronic cholecystitis. I have recommended a cholecystectomy. I discussed with him the plan to do this laparoscopically understanding the possibility of needing to convert to an open operation. I went over some of the risks of surgery including but not limited to bleeding, infection and bile duct injury. I also discussed the outpatient nature of the surgery and the expected recovery time.         Daniel Espinosa MD  [phone]  [facility], Department of Surgery

## 2021-12-08 NOTE — PROGRESS NOTES
I was consulted by Lloyd Marie MD and Chuy Kumar CNP to evaluate this patients gall bladder.    HPI: Federico Bright is a 26 year old male who has been experiencing some problems with epigastric pain. he has been noting this for about 8 months. It has been occurring sporadically.  He has had a few episodes of dark urine and jerrell colored stool.  It is not associated with nausea or vomiting.      Allergies:Patient has no known allergies.    Past Medical History:   Diagnosis Date     Anxiety      Depression      Nasal bones, closed fracture     Created by Conversion        Past Surgical History:   Procedure Laterality Date     OTHER SURGICAL HISTORY      tonsils     WISDOM TOOTH EXTRACTION         CURRENT MEDS:    Current Outpatient Medications:      escitalopram (LEXAPRO) 20 MG tablet, TAKE 1 TABLET(20 MG) BY MOUTH DAILY, Disp: 90 tablet, Rfl: 0    Family History   Problem Relation Age of Onset     Cancer Maternal Grandmother      Cancer Maternal Grandfather      Cancer Paternal Grandmother      Cancer Paternal Grandfather         reports that he has never smoked. He has never used smokeless tobacco. He reports that he does not drink alcohol and does not use drugs.    Review of Systems:  10 system were reviewed and all are within normal limits except for those listed in the HPI.      EXAM:  /70   Wt 114.3 kg (252 lb)   BMI 33.25 kg/m    GENERAL: Well developed male   EYES: Anicteric Sclera,  EOMI  CARDIAC: RRR w/out murmur  CHEST/LUNG: Clear to ascultation, No wheezes  ABDOMEN: Soft with, +Bowel Sounds  NEURO:No focal deficits, ambulatory  LYMPH: No Axillary or inguinal Adenopathy  EXTREMITIES: Ambulatory, No lower extremity deformities      LABS:  Lab Results   Component Value Date    WBC 10.0 04/26/2020    HGB 13.4 04/26/2020    HCT 39.4 04/26/2020    MCV 88 04/26/2020     04/26/2020     INR/Prothrombin Time  @LABRCNTIP(NA,K,CL,co2,bun,creatinine,labglom,glucose,calcium)@  Lab Results    Component Value Date     (H) 11/24/2021     (H) 11/24/2021    ALKPHOS 222 (H) 11/24/2021       IMAGES:   I reviewed the US and see the presence of Gall Stones  EXAM: ULTRASOUND ABDOMEN LIMITED  LOCATION: [facility]  DATE/TIME: 11/28/2021, 12:28 PM     INDICATION: Elevated LFTs.  COMPARISON: None.  TECHNIQUE: Limited abdominal ultrasound.     FINDINGS:     GALLBLADDER: Stones and sludge are present within the gallbladder. There is no Delacruz's sign.     BILE DUCTS: No biliary dilatation. The common duct measures 5 mm.     LIVER: Within the right hepatic lobe, there is a 1.7 x 1.6 x 1.5 cm heterogeneous hypoechoic lesion. No focal mass.     RIGHT KIDNEY: No hydronephrosis.     PANCREAS: The visualized portions are normal.     No ascites.                                                                      IMPRESSION:  1.  Cholelithiasis and gallbladder sludge without acute cholecystitis.  2.  1.7 cm heterogeneous hypoechoic lesion within the right hepatic lobe. This is indeterminate. Liver MRI is recommended for further evaluation.    Assessment/Plan: Pt with signs and symptoms consistent with chronic cholecystitis. I have recommended a cholecystectomy. I discussed with him the plan to do this laparoscopically understanding the possibility of needing to convert to an open operation. I went over some of the risks of surgery including but not limited to bleeding, infection and bile duct injury. I also discussed the outpatient nature of the surgery and the expected recovery time.         Daniel Espinosa MD  [phone]  [facility], Department of Surgery

## 2021-12-08 NOTE — TELEPHONE ENCOUNTER
Call can not be completed as dialed message- tried number twice. Sent [employer] A message to Federico regarding his surgery scheduled 1/3/2022 at [facility] with Dr. Espinosa

## 2021-12-09 DIAGNOSIS — Z11.59 ENCOUNTER FOR SCREENING FOR OTHER VIRAL DISEASES: ICD-10-CM

## 2021-12-15 ENCOUNTER — TELEPHONE (OUTPATIENT)
Dept: SURGERY | Facility: CLINIC | Age: 26
End: 2021-12-15
Payer: COMMERCIAL

## 2021-12-15 NOTE — TELEPHONE ENCOUNTER
Spoke with Federico about his surgery moved from 1/3 to 1/4 due to provider out of town. Covid test also moved one day.    Pt understood

## 2021-12-21 ENCOUNTER — HOSPITAL ENCOUNTER (OUTPATIENT)
Dept: MRI IMAGING | Facility: CLINIC | Age: 26
Discharge: HOME | End: 2021-12-21
Attending: NURSE PRACTITIONER | Admitting: NURSE PRACTITIONER
Payer: COMMERCIAL

## 2021-12-21 DIAGNOSIS — K76.9 HEPATIC LESION: ICD-10-CM

## 2021-12-21 PROCEDURE — A9585 GADOBUTROL INJECTION: HCPCS | Performed by: NURSE PRACTITIONER

## 2021-12-21 PROCEDURE — 255N000002 HC RX 255 OP 636: Performed by: NURSE PRACTITIONER

## 2021-12-21 PROCEDURE — 74183 MRI ABD W/O CNTR FLWD CNTR: CPT

## 2021-12-21 RX ORDER — GADOBUTROL 604.72 MG/ML
10 INJECTION INTRAVENOUS ONCE
Status: COMPLETED | OUTPATIENT
Start: 2021-12-21 | End: 2021-12-21

## 2021-12-21 RX ADMIN — GADOBUTROL 10 ML: 604.72 INJECTION INTRAVENOUS at 18:14

## 2021-12-31 ENCOUNTER — LAB (OUTPATIENT)
Dept: LAB | Facility: CLINIC | Age: 26
End: 2021-12-31
Payer: COMMERCIAL

## 2021-12-31 ENCOUNTER — OFFICE VISIT (OUTPATIENT)
Dept: FAMILY MEDICINE | Facility: CLINIC | Age: 26
End: 2021-12-31
Payer: COMMERCIAL

## 2021-12-31 VITALS
TEMPERATURE: 98.6 F | BODY MASS INDEX: 32.34 KG/M2 | HEART RATE: 67 BPM | SYSTOLIC BLOOD PRESSURE: 120 MMHG | HEIGHT: 73 IN | OXYGEN SATURATION: 97 % | WEIGHT: 244 LBS | DIASTOLIC BLOOD PRESSURE: 68 MMHG | RESPIRATION RATE: 14 BRPM

## 2021-12-31 DIAGNOSIS — Z01.818 PREOP GENERAL PHYSICAL EXAM: ICD-10-CM

## 2021-12-31 DIAGNOSIS — Z01.818 PRE-OP EXAM: Primary | ICD-10-CM

## 2021-12-31 DIAGNOSIS — Z11.59 ENCOUNTER FOR SCREENING FOR OTHER VIRAL DISEASES: ICD-10-CM

## 2021-12-31 DIAGNOSIS — K80.20 CALCULUS OF GALLBLADDER WITHOUT CHOLECYSTITIS WITHOUT OBSTRUCTION: ICD-10-CM

## 2021-12-31 LAB
ALBUMIN SERPL-MCNC: 4.1 G/DL (ref 3.5–5)
ALP SERPL-CCNC: 95 U/L (ref 45–120)
ALT SERPL W P-5'-P-CCNC: 21 U/L (ref 0–45)
ANION GAP SERPL CALCULATED.3IONS-SCNC: 11 MMOL/L (ref 5–18)
AST SERPL W P-5'-P-CCNC: 21 U/L (ref 0–40)
BILIRUB SERPL-MCNC: 0.4 MG/DL (ref 0–1)
BUN SERPL-MCNC: 9 MG/DL (ref 8–22)
CALCIUM SERPL-MCNC: 9.6 MG/DL (ref 8.5–10.5)
CHLORIDE BLD-SCNC: 108 MMOL/L (ref 98–107)
CO2 SERPL-SCNC: 21 MMOL/L (ref 22–31)
CREAT SERPL-MCNC: 1.04 MG/DL (ref 0.7–1.3)
ERYTHROCYTE [DISTWIDTH] IN BLOOD BY AUTOMATED COUNT: 12.3 % (ref 10–15)
GFR SERPL CREATININE-BSD FRML MDRD: >90 ML/MIN/1.73M2
GLUCOSE BLD-MCNC: 108 MG/DL (ref 70–125)
HCT VFR BLD AUTO: 44.8 % (ref 40–53)
HGB BLD-MCNC: 15 G/DL (ref 13.3–17.7)
MCH RBC QN AUTO: 29.5 PG (ref 26.5–33)
MCHC RBC AUTO-ENTMCNC: 33.5 G/DL (ref 31.5–36.5)
MCV RBC AUTO: 88 FL (ref 78–100)
PLATELET # BLD AUTO: 256 10E3/UL (ref 150–450)
POTASSIUM BLD-SCNC: 3.9 MMOL/L (ref 3.5–5)
PROT SERPL-MCNC: 7.3 G/DL (ref 6–8)
RBC # BLD AUTO: 5.08 10E6/UL (ref 4.4–5.9)
SODIUM SERPL-SCNC: 140 MMOL/L (ref 136–145)
WBC # BLD AUTO: 5.8 10E3/UL (ref 4–11)

## 2021-12-31 PROCEDURE — 85027 COMPLETE CBC AUTOMATED: CPT | Performed by: FAMILY MEDICINE

## 2021-12-31 PROCEDURE — 36415 COLL VENOUS BLD VENIPUNCTURE: CPT | Performed by: FAMILY MEDICINE

## 2021-12-31 PROCEDURE — 99214 OFFICE O/P EST MOD 30 MIN: CPT | Performed by: FAMILY MEDICINE

## 2021-12-31 PROCEDURE — U0005 INFEC AGEN DETEC AMPLI PROBE: HCPCS

## 2021-12-31 PROCEDURE — U0003 INFECTIOUS AGENT DETECTION BY NUCLEIC ACID (DNA OR RNA); SEVERE ACUTE RESPIRATORY SYNDROME CORONAVIRUS 2 (SARS-COV-2) (CORONAVIRUS DISEASE [COVID-19]), AMPLIFIED PROBE TECHNIQUE, MAKING USE OF HIGH THROUGHPUT TECHNOLOGIES AS DESCRIBED BY CMS-2020-01-R: HCPCS

## 2021-12-31 PROCEDURE — 80053 COMPREHEN METABOLIC PANEL: CPT | Performed by: FAMILY MEDICINE

## 2021-12-31 ASSESSMENT — MIFFLIN-ST. JEOR: SCORE: 2130.66

## 2021-12-31 NOTE — PROGRESS NOTES
[facility]  [address]  [place]  [facility] [address]  Phone: [phone]  Fax: [phone]  Primary Provider: Lloyd Maire  Pre-op Performing Provider: ANA CASSIDY   PREOPERATIVE EVALUATION:  Today's date: 12/31/2021    Federico Bright is a 26 year old male who presents for a preoperative evaluation.    Surgical Information:  Surgery/Procedure: gallbladder removal  Surgery Location: [facility]  Surgeon: Dr. Bell  Surgery Date: 1/4/22  Time of Surgery: TBD  Where patient plans to recover: At home with family  Fax number for surgical facility: Note does not need to be faxed, will be available electronically in Epic.    Type of Anesthesia Anticipated: General    Assessment & Plan     The proposed surgical procedure is considered LOW risk.    (Z01.818) Pre-op exam  (primary encounter diagnosis)  Comment:  Plan: CBC with platelets, Comprehensive metabolic         panel (BMP + Alb, Alk Phos, ALT, AST, Total.         Bili, TP)            (K80.20) Calculus of gallbladder without cholecystitis without obstruction  Comment: Plan: no pain and fever. Will have the surgery             Risks and Recommendations:  The patient has the following additional risks and recommendations for perioperative complications:   - No identified additional risk factors other than previously addressed    Medication Instructions:  Patient is to take all scheduled medications on the day of surgery    RECOMMENDATION:  APPROVAL GIVEN to proceed with proposed procedure, without further diagnostic evaluation.      [id number]}    Subjective     HPI related to upcoming procedure:  Cholecystectomy, laparoscopic, cholangiograms. He has gallbladder stones. Abnormal LFT.   He takes lexapro as instructed and no side effect.     Preop Questions 12/31/2021   1. Have you ever had a heart attack or stroke? No   2. Have you ever had surgery on your heart or blood vessels, such as a stent  placement, a coronary artery bypass, or surgery on an artery in your head, neck, heart, or legs? No   3. Do you have chest pain with activity? No   4. Do you have a history of  heart failure? No   5. Do you currently have a cold, bronchitis or symptoms of other infection? No   6. Do you have a cough, shortness of breath, or wheezing? No   7. Do you or anyone in your family have previous history of blood clots? No   8. Do you or does anyone in your family have a serious bleeding problem such as prolonged bleeding following surgeries or cuts? No   9. Have you ever had problems with anemia or been told to take iron pills? No   10. Have you had any abnormal blood loss such as black, tarry or bloody stools? No   11. Have you ever had a blood transfusion? No   12. Are you willing to have a blood transfusion if it is medically needed before, during, or after your surgery? Yes   13. Have you or any of your relatives ever had problems with anesthesia? No   14. Do you have sleep apnea, excessive snoring or daytime drowsiness? No   15. Do you have any artifical heart valves or other implanted medical devices like a pacemaker, defibrillator, or continuous glucose monitor? No   16. Do you have artificial joints? No   17. Are you allergic to latex? No       Health Care Directive:  Patient does not have a Health Care Directive or Living Will: Discussed advance care planning with patient; information given to patient to review.    Preoperative Review of :   reviewed - no record of controlled substances prescribed.  {        Review of Systems  Constitutional, neuro, ENT, endocrine, pulmonary, cardiac, gastrointestinal, genitourinary, musculoskeletal, integument and psychiatric systems are negative, except as otherwise noted.    Patient Active Problem List    Diagnosis Date Noted     Calculus of gallbladder without cholecystitis without obstruction 12/08/2021     Priority: Medium     Added automatically from request for surgery  "[id number]       Generalized anxiety disorder 09/09/2019     Priority: Medium     Palpitations      Priority: Medium     Overview:   Overview:   Created by Conversion  Created by Conversion         Asperger's Disorder      Priority: Medium     Overview:   Overview:   Created by Conversion  Created by Conversion         ADHD, Predominantly Inattentive Type      Priority: Medium     Overview:   Overview:   Created by Conversion  Replacement Utility updated for latest IMO load  Created by Conversion  Replacement Utility updated for latest IMO load         Ingrowing toenail 02/05/2015     Priority: Medium     Allergies      Priority: Medium     Created by Conversion          Past Medical History:   Diagnosis Date     Anxiety      Depression      Nasal bones, closed fracture     Created by Conversion      Past Surgical History:   Procedure Laterality Date     OTHER SURGICAL HISTORY      tonsils     WISDOM TOOTH EXTRACTION       Current Outpatient Medications   Medication Sig Dispense Refill     escitalopram (LEXAPRO) 20 MG tablet TAKE 1 TABLET(20 MG) BY MOUTH DAILY 90 tablet 0       No Known Allergies     Social History     Tobacco Use     Smoking status: Never Smoker     Smokeless tobacco: Never Used   Substance Use Topics     Alcohol use: No      History   Drug Use No     Comment: Drug use: Previous use of psychedelics         Objective     /68 (BP Location: Right arm, Patient Position: Sitting, Cuff Size: Adult Regular)   Pulse 67   Temp 98.6  F (37  C) (Oral)   Resp 14   Ht 1.854 m (6' 1\")   Wt 110.7 kg (244 lb)   SpO2 97%   BMI 32.19 kg/m      Physical Exam    GENERAL APPEARANCE: healthy, alert and no distress     EYES: EOMI,  PERRL     HENT: ear canals and TM's normal and nose and mouth without ulcers or lesions     NECK: no adenopathy, no asymmetry, masses, or scars and thyroid normal to palpation     RESP: lungs clear to auscultation - no rales, rhonchi or wheezes     CV: regular rates and rhythm, " normal S1 S2, no S3 or S4 and no murmur, click or rub     ABDOMEN:  soft, nontender, no HSM or masses and bowel sounds normal     MS: extremities normal- no gross deformities noted, no evidence of inflammation in joints, FROM in all extremities.     SKIN: no suspicious lesions or rashes.     NEURO: Normal strength and tone, sensory exam grossly normal, mentation intact and speech normal     PSYCH: mentation appears normal. and affect normal/bright     LYMPHATICS: No cervical adenopathy    Recent Labs   Lab Test 10/27/21  0857 04/26/20  2231   HGB  --  13.4*   PLT  --  215    140   POTASSIUM 4.3 3.8   CR 0.94 0.98        Diagnostics:  Labs pending at this time.  Results will be reviewed when available.   No EKG required for low risk surgery (cataract, skin procedure, breast biopsy, etc).    Revised Cardiac Risk Index (RCRI):  The patient has the following serious cardiovascular risks for perioperative complications:   - No serious cardiac risks = 0 points     RCRI Interpretation: 0 points: Class I (very low risk - 0.4% complication rate)           Signed Electronically by: Shobha Queen MD  Copy of this evaluation report is provided to requesting physician.

## 2021-12-31 NOTE — PATIENT INSTRUCTIONS
Patient Education   Preparing for Your Surgery  Getting started  A nurse will call you to review your health history and instructions. They will give you an arrival time based on your scheduled surgery time. Please be ready to share:    Your doctor's clinic name and phone number    Your medical, surgical and anesthesia history    A list of allergies and sensitivities    A list of medicines, including herbal treatments and over-the-counter drugs    Whether the patient has a legal guardian (ask how to send us the papers in advance)  Please tell us if you're pregnant--or if there's any chance you might be pregnant. Some surgeries may injure a fetus (unborn baby), so they require a pregnancy test. Surgeries that are safe for a fetus don't always need a test, and you can choose whether to have one.   If you have a child who's having surgery, please ask for a copy of Preparing for Your Child's Surgery.    Preparing for surgery    Within 30 days of surgery: Have a pre-op exam (sometimes called an H&P, or History and Physical). This can be done at a clinic or pre-operative center.  ? If you're having a , you may not need this exam. Talk to your care team.    At your pre-op exam, talk to your care team about all medicines you take. If you need to stop any medicines before surgery, ask when to start taking them again.  ? We do this for your safety. Many medicines can make you bleed too much during surgery. Some change how well surgery (anesthesia) drugs work.    Call your insurance company to let them know you're having surgery. (If you don't have insurance, call [phone].)    Call your clinic if there's any change in your health. This includes signs of a cold or flu (sore throat, runny nose, cough, rash, fever). It also includes a scrape or scratch near the surgery site.    If you have questions on the day of surgery, call your hospital or surgery center.  COVID testing  You may need to be tested for COVID-19  before having surgery. If so, we will give you instructions.  Eating and drinking guidelines  For your safety: Unless your surgeon tells you otherwise, follow the guidelines below.    Eat and drink as usual until 8 hours before surgery. After that, no food or milk.    Drink clear liquids until 2 hours before surgery. These are liquids you can see through, like water, Gatorade and Propel Water. You may also have black coffee and tea (no cream or milk).    Nothing by mouth within 2 hours of surgery. This includes gum, candy and breath mints.    If you drink alcohol: Stop drinking it the night before surgery.    If your care team tells you to take medicine on the morning of surgery, it's okay to take it with a sip of water.  Preventing infection    Shower or bathe the night before and morning of your surgery. Follow the instructions your clinic gave you. (If no instructions, use regular soap.)    Don't shave or clip hair near your surgery site. We'll remove the hair if needed.    Don't smoke or vape the morning of surgery. You may chew nicotine gum up to 2 hours before surgery. A nicotine patch is okay.  ? Note: Some surgeries require you to completely quit smoking and nicotine. Check with your surgeon.    Your care team will make every effort to keep you safe from infection. We will:  ? Clean our hands often with soap and water (or an alcohol-based hand rub).  ? Clean the skin at your surgery site with a special soap that kills germs.  ? Give you a special gown to keep you warm. (Cold raises the risk of infection.)  ? Wear special hair covers, masks, gowns and gloves during surgery.  ? Give antibiotic medicine, if prescribed. Not all surgeries need antibiotics.  What to bring on the day of surgery    Photo ID and insurance card    Copy of your health care directive, if you have one    Glasses and hearing aides (bring cases)  ? You can't wear contacts during surgery    Inhaler and eye drops, if you use them (tell us  about these when you arrive)    CPAP machine or breathing device, if you use them    A few personal items, if spending the night    If you have . . .  ? A pacemaker, ICD (cardiac defibrillator) or other implant: Bring the ID card.  ? An implanted stimulator: Bring the remote control.  ? A legal guardian: Bring a copy of the certified (court-stamped) guardianship papers.  Please remove any jewelry, including body piercings. Leave jewelry and other valuables at home.  If you're going home the day of surgery    You must have a responsible adult drive you home. They should stay with you overnight as well.    If you don't have someone to stay with you, and you aren't safe to go home alone, we may keep you overnight. Insurance often won't pay for this.  After surgery  If it's hard to control your pain or you need more pain medicine, please call your surgeon's office.  Questions?   If you have any questions for your care team, list them here: _________________________________________________________________________________________________________________________________________________________________________ ____________________________________ ____________________________________ ____________________________________  For informational purposes only. Not to replace the advice of your health care provider. Copyright   2003, 2019 [facility]. All rights reserved. Clinically reviewed by Sarah Anderson MD. [employer] [id number] - REV 07/21.

## 2022-01-01 LAB — SARS-COV-2 RNA RESP QL NAA+PROBE: NEGATIVE

## 2022-01-03 ENCOUNTER — ANESTHESIA EVENT (OUTPATIENT)
Dept: SURGERY | Facility: HOSPITAL | Age: 27
End: 2022-01-03
Payer: COMMERCIAL

## 2022-01-03 ENCOUNTER — NURSE TRIAGE (OUTPATIENT)
Dept: NURSING | Facility: CLINIC | Age: 27
End: 2022-01-03
Payer: COMMERCIAL

## 2022-01-03 NOTE — TELEPHONE ENCOUNTER
S-(situation): Call from patient who says he has a side effects after a blood draw from Friday. Patient reports lab staff had to stick him a couple times to draw his blood. Patient reports sharp, electrical pain that went through his arm. Patient reports he continues to have tingling in his arm if he over extends his arm.    B-(background):       A-(assessment): possible nerve damage      R-(recommendations): Advised to see provider for follow-up.    Reviewed care advice with caller per RN triage protocol guideline.  Advised to call back with worsening symptoms, concerns or questions.   Caller verbalized understanding.  Transferred to schedule appointment.        Wendy James RN/[facility]    Reason for Disposition    Requesting regular office appointment    Additional Information    Negative: RN needs further essential information from caller in order to complete triage    Protocols used: INFORMATION ONLY CALL-A-

## 2022-01-04 ENCOUNTER — ANESTHESIA (OUTPATIENT)
Dept: SURGERY | Facility: HOSPITAL | Age: 27
End: 2022-01-04
Payer: COMMERCIAL

## 2022-01-04 ENCOUNTER — HOSPITAL ENCOUNTER (OUTPATIENT)
Facility: HOSPITAL | Age: 27
Discharge: HOME | End: 2022-01-04
Attending: SURGERY | Admitting: SURGERY
Payer: COMMERCIAL

## 2022-01-04 ENCOUNTER — APPOINTMENT (OUTPATIENT)
Dept: RADIOLOGY | Facility: HOSPITAL | Age: 27
End: 2022-01-04
Attending: SURGERY
Payer: COMMERCIAL

## 2022-01-04 VITALS
BODY MASS INDEX: 31.87 KG/M2 | HEART RATE: 72 BPM | HEIGHT: 73 IN | OXYGEN SATURATION: 96 % | RESPIRATION RATE: 20 BRPM | TEMPERATURE: 98.2 F | DIASTOLIC BLOOD PRESSURE: 72 MMHG | WEIGHT: 240.5 LBS | SYSTOLIC BLOOD PRESSURE: 122 MMHG

## 2022-01-04 DIAGNOSIS — K80.20 CALCULUS OF GALLBLADDER WITHOUT CHOLECYSTITIS WITHOUT OBSTRUCTION: ICD-10-CM

## 2022-01-04 DIAGNOSIS — K80.20 SYMPTOMATIC CHOLELITHIASIS: Primary | ICD-10-CM

## 2022-01-04 PROCEDURE — 999N000141 HC STATISTIC PRE-PROCEDURE NURSING ASSESSMENT: Performed by: SURGERY

## 2022-01-04 PROCEDURE — 250N000011 HC RX IP 250 OP 636: Performed by: SURGERY

## 2022-01-04 PROCEDURE — 250N000025 HC SEVOFLURANE, PER MIN: Performed by: SURGERY

## 2022-01-04 PROCEDURE — 360N000083 HC SURGERY LEVEL 3 W/ FLUORO, PER MIN: Performed by: SURGERY

## 2022-01-04 PROCEDURE — 370N000017 HC ANESTHESIA TECHNICAL FEE, PER MIN: Performed by: SURGERY

## 2022-01-04 PROCEDURE — 250N000009 HC RX 250: Performed by: SURGERY

## 2022-01-04 PROCEDURE — 250N000011 HC RX IP 250 OP 636: Performed by: NURSE ANESTHETIST, CERTIFIED REGISTERED

## 2022-01-04 PROCEDURE — 47563 LAPARO CHOLECYSTECTOMY/GRAPH: CPT | Performed by: SURGERY

## 2022-01-04 PROCEDURE — 88304 TISSUE EXAM BY PATHOLOGIST: CPT | Mod: TC | Performed by: SURGERY

## 2022-01-04 PROCEDURE — 710N000009 HC RECOVERY PHASE 1, LEVEL 1, PER MIN: Performed by: SURGERY

## 2022-01-04 PROCEDURE — 258N000003 HC RX IP 258 OP 636: Performed by: ANESTHESIOLOGY

## 2022-01-04 PROCEDURE — 710N000012 HC RECOVERY PHASE 2, PER MINUTE: Performed by: SURGERY

## 2022-01-04 PROCEDURE — 999N000180 XR SURGERY CARM FLUORO LESS THAN 5 MIN

## 2022-01-04 PROCEDURE — 272N000001 HC OR GENERAL SUPPLY STERILE: Performed by: SURGERY

## 2022-01-04 PROCEDURE — 250N000011 HC RX IP 250 OP 636: Performed by: ANESTHESIOLOGY

## 2022-01-04 PROCEDURE — 250N000009 HC RX 250: Performed by: NURSE ANESTHETIST, CERTIFIED REGISTERED

## 2022-01-04 RX ORDER — FENTANYL CITRATE 50 UG/ML
25 INJECTION, SOLUTION INTRAMUSCULAR; INTRAVENOUS EVERY 5 MIN PRN
Status: DISCONTINUED | OUTPATIENT
Start: 2022-01-04 | End: 2022-01-04 | Stop reason: HOSPADM

## 2022-01-04 RX ORDER — SODIUM CHLORIDE, SODIUM LACTATE, POTASSIUM CHLORIDE, CALCIUM CHLORIDE 600; 310; 30; 20 MG/100ML; MG/100ML; MG/100ML; MG/100ML
INJECTION, SOLUTION INTRAVENOUS CONTINUOUS
Status: DISCONTINUED | OUTPATIENT
Start: 2022-01-04 | End: 2022-01-04 | Stop reason: HOSPADM

## 2022-01-04 RX ORDER — CEFAZOLIN SODIUM 2 G/100ML
2 INJECTION, SOLUTION INTRAVENOUS
Status: COMPLETED | OUTPATIENT
Start: 2022-01-04 | End: 2022-01-04

## 2022-01-04 RX ORDER — ONDANSETRON 4 MG/1
4 TABLET, ORALLY DISINTEGRATING ORAL EVERY 30 MIN PRN
Status: DISCONTINUED | OUTPATIENT
Start: 2022-01-04 | End: 2022-01-04 | Stop reason: HOSPADM

## 2022-01-04 RX ORDER — ONDANSETRON 4 MG/1
4 TABLET, ORALLY DISINTEGRATING ORAL
Status: DISCONTINUED | OUTPATIENT
Start: 2022-01-04 | End: 2022-01-04 | Stop reason: HOSPADM

## 2022-01-04 RX ORDER — NALOXONE HYDROCHLORIDE 0.4 MG/ML
0.4 INJECTION, SOLUTION INTRAMUSCULAR; INTRAVENOUS; SUBCUTANEOUS
Status: DISCONTINUED | OUTPATIENT
Start: 2022-01-04 | End: 2022-01-04 | Stop reason: HOSPADM

## 2022-01-04 RX ORDER — ONDANSETRON 2 MG/ML
INJECTION INTRAMUSCULAR; INTRAVENOUS PRN
Status: DISCONTINUED | OUTPATIENT
Start: 2022-01-04 | End: 2022-01-04

## 2022-01-04 RX ORDER — LIDOCAINE 40 MG/G
CREAM TOPICAL
Status: DISCONTINUED | OUTPATIENT
Start: 2022-01-04 | End: 2022-01-04 | Stop reason: HOSPADM

## 2022-01-04 RX ORDER — BUPIVACAINE HYDROCHLORIDE 2.5 MG/ML
INJECTION, SOLUTION INFILTRATION; PERINEURAL PRN
Status: DISCONTINUED | OUTPATIENT
Start: 2022-01-04 | End: 2022-01-04 | Stop reason: HOSPADM

## 2022-01-04 RX ORDER — HYDROMORPHONE HCL IN WATER/PF 6 MG/30 ML
0.2 PATIENT CONTROLLED ANALGESIA SYRINGE INTRAVENOUS EVERY 5 MIN PRN
Status: DISCONTINUED | OUTPATIENT
Start: 2022-01-04 | End: 2022-01-04 | Stop reason: HOSPADM

## 2022-01-04 RX ORDER — FENTANYL CITRATE 50 UG/ML
INJECTION, SOLUTION INTRAMUSCULAR; INTRAVENOUS PRN
Status: DISCONTINUED | OUTPATIENT
Start: 2022-01-04 | End: 2022-01-04

## 2022-01-04 RX ORDER — ACETAMINOPHEN 325 MG/1
650 TABLET ORAL
Status: DISCONTINUED | OUTPATIENT
Start: 2022-01-04 | End: 2022-01-04 | Stop reason: HOSPADM

## 2022-01-04 RX ORDER — CEFAZOLIN SODIUM 2 G/100ML
2 INJECTION, SOLUTION INTRAVENOUS SEE ADMIN INSTRUCTIONS
Status: DISCONTINUED | OUTPATIENT
Start: 2022-01-04 | End: 2022-01-04 | Stop reason: HOSPADM

## 2022-01-04 RX ORDER — KETOROLAC TROMETHAMINE 30 MG/ML
INJECTION, SOLUTION INTRAMUSCULAR; INTRAVENOUS PRN
Status: DISCONTINUED | OUTPATIENT
Start: 2022-01-04 | End: 2022-01-04

## 2022-01-04 RX ORDER — KETAMINE HYDROCHLORIDE 50 MG/ML
INJECTION, SOLUTION INTRAMUSCULAR; INTRAVENOUS PRN
Status: DISCONTINUED | OUTPATIENT
Start: 2022-01-04 | End: 2022-01-04

## 2022-01-04 RX ORDER — OXYCODONE HYDROCHLORIDE 5 MG/1
5 TABLET ORAL EVERY 4 HOURS PRN
Status: DISCONTINUED | OUTPATIENT
Start: 2022-01-04 | End: 2022-01-04 | Stop reason: HOSPADM

## 2022-01-04 RX ORDER — DEXAMETHASONE SODIUM PHOSPHATE 10 MG/ML
INJECTION, SOLUTION INTRAMUSCULAR; INTRAVENOUS PRN
Status: DISCONTINUED | OUTPATIENT
Start: 2022-01-04 | End: 2022-01-04

## 2022-01-04 RX ORDER — LIDOCAINE HYDROCHLORIDE 20 MG/ML
INJECTION, SOLUTION INFILTRATION; PERINEURAL PRN
Status: DISCONTINUED | OUTPATIENT
Start: 2022-01-04 | End: 2022-01-04

## 2022-01-04 RX ORDER — PROPOFOL 10 MG/ML
INJECTION, EMULSION INTRAVENOUS PRN
Status: DISCONTINUED | OUTPATIENT
Start: 2022-01-04 | End: 2022-01-04

## 2022-01-04 RX ORDER — HYDROCODONE BITARTRATE AND ACETAMINOPHEN 5; 325 MG/1; MG/1
1-2 TABLET ORAL EVERY 4 HOURS PRN
Qty: 15 TABLET | Refills: 0 | Status: SHIPPED | OUTPATIENT
Start: 2022-01-04 | End: 2022-01-06

## 2022-01-04 RX ORDER — NALOXONE HYDROCHLORIDE 0.4 MG/ML
0.2 INJECTION, SOLUTION INTRAMUSCULAR; INTRAVENOUS; SUBCUTANEOUS
Status: DISCONTINUED | OUTPATIENT
Start: 2022-01-04 | End: 2022-01-04 | Stop reason: HOSPADM

## 2022-01-04 RX ORDER — ONDANSETRON 2 MG/ML
4 INJECTION INTRAMUSCULAR; INTRAVENOUS EVERY 30 MIN PRN
Status: DISCONTINUED | OUTPATIENT
Start: 2022-01-04 | End: 2022-01-04 | Stop reason: HOSPADM

## 2022-01-04 RX ORDER — MEPERIDINE HYDROCHLORIDE 25 MG/ML
12.5 INJECTION INTRAMUSCULAR; INTRAVENOUS; SUBCUTANEOUS
Status: DISCONTINUED | OUTPATIENT
Start: 2022-01-04 | End: 2022-01-04 | Stop reason: HOSPADM

## 2022-01-04 RX ORDER — FENTANYL CITRATE 50 UG/ML
25 INJECTION, SOLUTION INTRAMUSCULAR; INTRAVENOUS
Status: DISCONTINUED | OUTPATIENT
Start: 2022-01-04 | End: 2022-01-04 | Stop reason: HOSPADM

## 2022-01-04 RX ORDER — HYDROCODONE BITARTRATE AND ACETAMINOPHEN 5; 325 MG/1; MG/1
1-2 TABLET ORAL EVERY 4 HOURS PRN
Status: DISCONTINUED | OUTPATIENT
Start: 2022-01-04 | End: 2022-01-04 | Stop reason: HOSPADM

## 2022-01-04 RX ORDER — LIDOCAINE HYDROCHLORIDE AND EPINEPHRINE 10; 10 MG/ML; UG/ML
INJECTION, SOLUTION INFILTRATION; PERINEURAL PRN
Status: DISCONTINUED | OUTPATIENT
Start: 2022-01-04 | End: 2022-01-04 | Stop reason: HOSPADM

## 2022-01-04 RX ADMIN — LIDOCAINE HYDROCHLORIDE 60 MG: 20 INJECTION, SOLUTION INFILTRATION; PERINEURAL at 07:37

## 2022-01-04 RX ADMIN — CEFAZOLIN SODIUM 2 G: 2 INJECTION, SOLUTION INTRAVENOUS at 07:47

## 2022-01-04 RX ADMIN — FENTANYL CITRATE 25 MCG: 50 INJECTION INTRAMUSCULAR; INTRAVENOUS at 10:01

## 2022-01-04 RX ADMIN — DEXAMETHASONE SODIUM PHOSPHATE 10 MG: 10 INJECTION, SOLUTION INTRAMUSCULAR; INTRAVENOUS at 07:51

## 2022-01-04 RX ADMIN — ONDANSETRON 4 MG: 2 INJECTION INTRAMUSCULAR; INTRAVENOUS at 08:43

## 2022-01-04 RX ADMIN — KETOROLAC TROMETHAMINE 30 MG: 30 INJECTION, SOLUTION INTRAMUSCULAR; INTRAVENOUS at 09:02

## 2022-01-04 RX ADMIN — MIDAZOLAM HYDROCHLORIDE 2 MG: 1 INJECTION, SOLUTION INTRAMUSCULAR; INTRAVENOUS at 07:26

## 2022-01-04 RX ADMIN — FENTANYL CITRATE 50 MCG: 50 INJECTION, SOLUTION INTRAMUSCULAR; INTRAVENOUS at 08:06

## 2022-01-04 RX ADMIN — KETAMINE HYDROCHLORIDE 50 MG: 50 INJECTION, SOLUTION INTRAMUSCULAR; INTRAVENOUS at 07:37

## 2022-01-04 RX ADMIN — SODIUM CHLORIDE, POTASSIUM CHLORIDE, SODIUM LACTATE AND CALCIUM CHLORIDE: 600; 310; 30; 20 INJECTION, SOLUTION INTRAVENOUS at 08:56

## 2022-01-04 RX ADMIN — ONDANSETRON 4 MG: 2 INJECTION INTRAMUSCULAR; INTRAVENOUS at 09:54

## 2022-01-04 RX ADMIN — ROCURONIUM BROMIDE 50 MG: 50 INJECTION, SOLUTION INTRAVENOUS at 07:37

## 2022-01-04 RX ADMIN — FENTANYL CITRATE 100 MCG: 50 INJECTION, SOLUTION INTRAMUSCULAR; INTRAVENOUS at 07:37

## 2022-01-04 RX ADMIN — SODIUM CHLORIDE, POTASSIUM CHLORIDE, SODIUM LACTATE AND CALCIUM CHLORIDE 100 ML/HR: 600; 310; 30; 20 INJECTION, SOLUTION INTRAVENOUS at 06:30

## 2022-01-04 RX ADMIN — PROPOFOL 200 MG: 10 INJECTION, EMULSION INTRAVENOUS at 07:37

## 2022-01-04 RX ADMIN — ROCURONIUM BROMIDE 10 MG: 50 INJECTION, SOLUTION INTRAVENOUS at 08:19

## 2022-01-04 RX ADMIN — SUGAMMADEX 200 MG: 100 INJECTION, SOLUTION INTRAVENOUS at 09:02

## 2022-01-04 RX ADMIN — FENTANYL CITRATE 50 MCG: 50 INJECTION, SOLUTION INTRAMUSCULAR; INTRAVENOUS at 08:47

## 2022-01-04 ASSESSMENT — MIFFLIN-ST. JEOR: SCORE: 2114.78

## 2022-01-04 NOTE — OP NOTE
Operative Note    Name:  Federico Bright  Location: [facility] Main OR  Procedure Date:  1/4/2022  PCP:  Lloyd Marie    Surgery Performed:  Procedure/Surgery Information   Procedure: Procedure(s):  CHOLECYSTECTOMY, LAPAROSCOPIC  CHOLANGIOGRAMS   Surgeon(s): Surgeon(s) and Role:     * Daniel Espinosa MD - Primary   Specimens: ID Type Source Tests Collected by Time Destination   1 : GALLBLADDER Tissue Gallbladder SURGICAL PATHOLOGY EXAM Daniel Espinosa MD 1/4/2022  8:38 AM                Pre-Procedure Diagnosis:  Calculus of gallbladder without cholecystitis without obstruction [K80.20]    Post-Procedure Diagnosis:    Calculus of gallbladder without cholecystitis without obstruction [K80.20]    Anesthesia:  General     Past Medical History:   Diagnosis Date     Anxiety      Asperger's disorder      Depression      Nasal bones, closed fracture     Created by Conversion        Patient Active Problem List    Diagnosis Date Noted     Calculus of gallbladder without cholecystitis without obstruction 12/08/2021     Priority: Medium     Added automatically from request for surgery [id number]       Generalized anxiety disorder 09/09/2019     Priority: Medium     Palpitations      Priority: Medium     Overview:   Overview:   Created by Conversion  Created by Conversion         Asperger's Disorder      Priority: Medium     Overview:   Overview:   Created by Conversion  Created by Conversion         ADHD, Predominantly Inattentive Type      Priority: Medium     Overview:   Overview:   Created by Conversion  Replacement Utility updated for latest IMO load  Created by Conversion  Replacement Utility updated for latest IMO load         Ingrowing toenail 02/05/2015     Priority: Medium     Allergies      Priority: Medium     Created by Conversion           Findings:  Stones in the Cystic Duct    Operative Report:    The patient was seen again in the Holding Room. The risks, benefits, complications, treatment  options, and expected outcomes were discussed with the patient. The possibilities of reaction to medication, pulmonary aspiration, perforation of viscus, bleeding, recurrent infection, finding a normal gallbladder, the need for additional procedures, failure to diagnose a condition, the possible need to convert to an open procedure, and creating a complication requiring transfusion or operation were discussed with the patient. The patient and/or family concurred with the proposed plan, giving informed consent. The site of surgery properly noted/marked. The patient was taken to Operating Room, identified, and the procedure verified as Laparoscopic Cholecystectomy with Intra Operative Cholangiogram.  A Time Out was held and the above information confirmed.    Prior to the induction of general anesthesia, antibiotic prophylaxis was administered. General endotracheal anesthesia was then administered and tolerated well. After the induction, the abdomen was prepped in the usual sterile fashion. The patient was positioned in the supine position with the left arm comfortably tucked, along with some reverse Trendelenburg.    Local anesthetic agent was injected into the skin near the umbilicus and an incision made. A periumbilical incision was made and a 5mm trocar was placed under direct visualization using the optiview technique, and a pneumoperitoneum was brought up to 15 mm Hg. . 2 5's and a  10 mm port was placed under direct vision.  All skin incisions were infiltrated with a local anesthetic agent before making the incision and placing the trocars.     The gallbladder was identified, the fundus grasped and retracted cephalad.. The infundibulum was grasped and retracted laterally, exposing the peritoneum overlying the triangle of Calot. This was then divided with hook cautery. The cystic duct was clearly identified and bluntly dissected circumferentially. The junctions of the gallbladder, cystic duct was clearly  identified and clipped on the gall bladder side with 2 clips. The cystic artery branched off in a few direction but these branches were identified, clipped and divided.      The Cystic duct was partially divided.  A cholangiogram catheter was brought in to the abdominal cavity through a 16-gauge Angiocath.  The catheter tip was advanced into the partially incised cystic duct and held in place with 2 clips.  The cholangiogram seal was tested with normal saline and found and found to flow but did not leak out around the cystic duct.  The C-arm was brought into place and intraoperative cholangiogram was performed under direct visualization of fluoroscopy.  The findings of the cholangiogram;  good filling down in through the cystic duct and some filling into the common hepatic and common bile duct.  The flow went down into the common bile duct and progressed right into the duodenum.  These are findings consistent with a normal IOC.    After completing the cholangiogram the clips holding the catheter in place were removed and the catheter was removed from the intra-abdominal cavity.  3 clips were placed proximally on the cystic duct and the cystic duct was divided sharply with laparoscopic scissors.      The gallbladder was dissected from the liver bed in retrograde fashion with the electrocautery. The gallbladder was removed. Suction & irrigation was used to insure a clean surgical site with good hemostasis.     Pneumoperitoneum was completely reduced after viewing removal of the trocars under direct vision. The wound was thoroughly irrigated and the fascia of the 10 mm trocar site was then closed with a figure of eight suture, 0 vicryl.; the skin was then closed with 4-0 monocryl and a sterile dressing was applied.    Instrument, sponge, and needle counts were correct at closure and at the conclusion of the case.    Estimated Blood Loss:   5 cc       Drains:        Implants:  * No implants in log *    Complications:     None    Daniel Espinosa MD     Date: 1/4/2022  Time: 9:42 AM

## 2022-01-04 NOTE — ANESTHESIA PROCEDURE NOTES
Airway       Patient location during procedure: OR       Procedure Start/Stop Times: 1/4/2022 7:40 AM  Staff -        Anesthesiologist:  Lizy Lino MD       CRNA: Neli Glover APRN CRNA       Other Anesthesia Staff: Gutierrez Mckeon       Performed By: CRNA and SRNA  Consent for Airway        Urgency: elective  Indications and Patient Condition       Indications for airway management: denisse-procedural       Induction type:intravenous       Mask difficulty assessment: 2 - vent by mask + OA or adjuvant +/- NMBA    Final Airway Details       Final airway type: endotracheal airway       Successful airway: ETT - single  Endotracheal Airway Details        ETT size (mm): 7.5       Cuffed: yes       Successful intubation technique: direct laryngoscopy       DL Blade Type: Contreras 2       Grade View of Cords: 1       Adjucts: stylet and tooth guard       Position: Right       Measured from: gums/teeth       Secured at (cm): 22       Bite block used: None    Post intubation assessment        Placement verified by: capnometry, equal breath sounds and chest rise        Number of attempts at approach: 1       Number of other approaches attempted: 0       Secured with: silk tape       Ease of procedure: easy       Dentition: Intact

## 2022-01-04 NOTE — ANESTHESIA CARE TRANSFER NOTE
Patient: Federico Bright    Procedure: Procedure(s):  CHOLECYSTECTOMY, LAPAROSCOPIC  CHOLANGIOGRAMS       Diagnosis: Calculus of gallbladder without cholecystitis without obstruction [K80.20]  Diagnosis Additional Information: No value filed.    Anesthesia Type:   General     Note:    Oropharynx: oropharynx clear of all foreign objects  Level of Consciousness: drowsy  Oxygen Supplementation: face mask  Level of Supplemental Oxygen (L/min / FiO2): 10  Independent Airway: airway patency satisfactory and stable  Dentition: dentition unchanged  Vital Signs Stable: post-procedure vital signs reviewed and stable  Report to RN Given: handoff report given  Patient transferred to: PACU  Comments: Pt transferred to PACU. Extubated in OR and spontaneously breathing with sufficient gas exchange. On 10L O2 via FM. No airway. VSS. Normothermic. Report to RN at bedside.   TONNY Galvan CRNA   Handoff Report: Identifed the Patient, Identified the Reponsible Provider, Reviewed the pertinent medical history, Discussed the surgical course, Reviewed Intra-OP anesthesia mangement and issues during anesthesia, Set expectations for post-procedure period and Allowed opportunity for questions and acknowledgement of understanding      Vitals:  Vitals Value Taken Time   /79 01/04/22 0917   Temp 36.8  C (98.2  F) 01/04/22 0917   Pulse 82 01/04/22 0918   Resp 23 01/04/22 0918   SpO2 92 % 01/04/22 0918   Vitals shown include unvalidated device data.    Electronically Signed By: TONNY Langston CRNA  January 4, 2022  9:19 AM

## 2022-01-04 NOTE — ANESTHESIA PREPROCEDURE EVALUATION
"Anesthesia Pre-Procedure Evaluation    Patient: Federico Bright   MRN: [id number] : 1995        Preoperative Diagnosis: Calculus of gallbladder without cholecystitis without obstruction [K80.20]    Procedure : Procedure(s):  CHOLECYSTECTOMY, LAPAROSCOPIC  CHOLANGIOGRAMS          Past Medical History:   Diagnosis Date     Anxiety      Asperger's disorder      Depression      Nasal bones, closed fracture     Created by Conversion       Past Surgical History:   Procedure Laterality Date     OTHER SURGICAL HISTORY      tonsils     WISDOM TOOTH EXTRACTION        No Known Allergies   Social History     Tobacco Use     Smoking status: Never Smoker     Smokeless tobacco: Never Used   Substance Use Topics     Alcohol use: No      Wt Readings from Last 1 Encounters:   22 109.1 kg (240 lb 8 oz)        Anesthesia Evaluation            ROS/MED HX  ENT/Pulmonary:  - neg pulmonary ROS     Neurologic:  - neg neurologic ROS     Cardiovascular:  - neg cardiovascular ROS     METS/Exercise Tolerance:     Hematologic:  - neg hematologic  ROS     Musculoskeletal:  - neg musculoskeletal ROS     GI/Hepatic:  - neg GI/hepatic ROS     Renal/Genitourinary:  - neg Renal ROS     Endo:  - neg endo ROS     Psychiatric/Substance Use: Comment: Asperger\"s disorder    (+) psychiatric history anxiety     Infectious Disease:  - neg infectious disease ROS     Malignancy:       Other:            Physical Exam    Airway  airway exam normal      Mallampati: I   TM distance: > 3 FB   Neck ROM: full     Respiratory Devices and Support         Dental  no notable dental history         Cardiovascular   cardiovascular exam normal          Pulmonary   pulmonary exam normal                OUTSIDE LABS:  CBC:   Lab Results   Component Value Date    WBC 5.8 2021    WBC 10.0 2020    HGB 15.0 2021    HGB 13.4 (L) 2020    HCT 44.8 2021    HCT 39.4 (L) 2020     2021     2020     BMP:   Lab " Results   Component Value Date     12/31/2021     10/27/2021    POTASSIUM 3.9 12/31/2021    POTASSIUM 4.3 10/27/2021    CHLORIDE 108 (H) 12/31/2021    CHLORIDE 104 10/27/2021    CO2 21 (L) 12/31/2021    CO2 24 10/27/2021    BUN 9 12/31/2021    BUN 12 10/27/2021    CR 1.04 12/31/2021    CR 0.94 10/27/2021     12/31/2021    GLC 82 10/27/2021     COAGS: No results found for: PTT, INR, FIBR  POC: No results found for: BGM, HCG, HCGS  HEPATIC:   Lab Results   Component Value Date    ALBUMIN 4.1 12/31/2021    PROTTOTAL 7.3 12/31/2021    ALT 21 12/31/2021    AST 21 12/31/2021    ALKPHOS 95 12/31/2021    BILITOTAL 0.4 12/31/2021     OTHER:   Lab Results   Component Value Date    ANGELA 9.6 12/31/2021    LIPASE 34 10/27/2021    TSH 4.91 05/11/2018       Anesthesia Plan    ASA Status:  2   NPO Status:  NPO Appropriate    Anesthesia Type: General.     - Airway: ETT   Induction: Intravenous.   Maintenance: Balanced.        Consents    Anesthesia Plan(s) and associated risks, benefits, and realistic alternatives discussed. Questions answered and patient/representative(s) expressed understanding.    - Discussed:     - Discussed with:  Patient         Postoperative Care    Pain management: Multi-modal analgesia.   PONV prophylaxis: Ondansetron (or other 5HT-3), Dexamethasone or Solumedrol     Comments:    Other Comments: GETA  1 PIV  Fentanyl prn, ketamine, dilaudid prn, toradol if OK with surgeon  hardy Higgins MD

## 2022-01-04 NOTE — ANESTHESIA POSTPROCEDURE EVALUATION
Patient: Federico Bright    Procedure: Procedure(s):  CHOLECYSTECTOMY, LAPAROSCOPIC  CHOLANGIOGRAMS       Diagnosis:Calculus of gallbladder without cholecystitis without obstruction [K80.20]  Diagnosis Additional Information: No value filed.    Anesthesia Type:  General    Note:  Disposition: Outpatient   Postop Pain Control: Uneventful            Sign Out: Well controlled pain   PONV: No   Neuro/Psych: Uneventful            Sign Out: Acceptable/Baseline neuro status   Airway/Respiratory: Uneventful            Sign Out: Acceptable/Baseline resp. status   CV/Hemodynamics: Uneventful            Sign Out: Acceptable CV status   Other NRE: NONE   DID A NON-ROUTINE EVENT OCCUR? No           Last vitals:  Vitals Value Taken Time   /77 01/04/22 0930   Temp 36.8  C (98.2  F) 01/04/22 0917   Pulse 97 01/04/22 0940   Resp 16 01/04/22 0940   SpO2 97 % 01/04/22 0940   Vitals shown include unvalidated device data.    Electronically Signed By: Lizy Lino MD  January 4, 2022  9:41 AM

## 2022-01-05 ENCOUNTER — TELEPHONE (OUTPATIENT)
Dept: SURGERY | Facility: CLINIC | Age: 27
End: 2022-01-05
Payer: COMMERCIAL

## 2022-01-05 LAB
PATH REPORT.COMMENTS IMP SPEC: NORMAL
PATH REPORT.COMMENTS IMP SPEC: NORMAL
PATH REPORT.FINAL DX SPEC: NORMAL
PATH REPORT.GROSS SPEC: NORMAL
PATH REPORT.MICROSCOPIC SPEC OTHER STN: NORMAL
PATH REPORT.RELEVANT HX SPEC: NORMAL
PHOTO IMAGE: NORMAL

## 2022-01-05 PROCEDURE — 88304 TISSUE EXAM BY PATHOLOGIST: CPT | Mod: 26 | Performed by: PATHOLOGY

## 2022-01-05 NOTE — TELEPHONE ENCOUNTER
I spoke with Brian and we discussed post-operative cares such as dressing care, pain management, and diet. His main concern is shoulder pain from C02 gas. Reassured him this will improve over the next 24-48 hours. He also requested a letter for work which I have placed in My Chart for him. He had no other questions at this time and will call back as needed.      [facility]     Adriana Black RN, BSN  [facility]  General Surgery  [facility]  [address] [place] [id number]  [email or web address]   Office:[phone]  Employed by [employer],                
Patient had surgery yesterday and needs to know when he can return to work.  Also needs a return to work note please send to [employer].     Any questions, please contact Silas.    Thanks!  
no

## 2022-01-06 ENCOUNTER — NURSE TRIAGE (OUTPATIENT)
Dept: NURSING | Facility: CLINIC | Age: 27
End: 2022-01-06

## 2022-01-06 ENCOUNTER — TELEPHONE (OUTPATIENT)
Dept: SURGERY | Facility: CLINIC | Age: 27
End: 2022-01-06
Payer: COMMERCIAL

## 2022-01-06 ENCOUNTER — OFFICE VISIT (OUTPATIENT)
Dept: FAMILY MEDICINE | Facility: CLINIC | Age: 27
End: 2022-01-06
Payer: COMMERCIAL

## 2022-01-06 VITALS
DIASTOLIC BLOOD PRESSURE: 74 MMHG | HEART RATE: 75 BPM | SYSTOLIC BLOOD PRESSURE: 110 MMHG | WEIGHT: 245 LBS | OXYGEN SATURATION: 96 % | BODY MASS INDEX: 32.32 KG/M2

## 2022-01-06 DIAGNOSIS — M79.631 PAIN OF RIGHT FOREARM: Primary | ICD-10-CM

## 2022-01-06 PROCEDURE — 99213 OFFICE O/P EST LOW 20 MIN: CPT | Performed by: FAMILY MEDICINE

## 2022-01-06 NOTE — TELEPHONE ENCOUNTER
[facility] Post-Op Phone Call                     Surgeon: Daniel Espinosa MD    Date of Surgery: 01/04/2022   Discharge Date: 01/04/2022    Date/Time Called:   Date: 1/6/2022 Time: 12:56 PM   Attempt: First    Pain Control:  Intensity: Mild (1 - 3)  Duration/Location/Explain: At incisions   What makes it better/worse? Resting, Aleve     Medications:  Narcotic Use - No  Drug type: Stopped taking narcotics yesterday       Incisions:  Drainage? clean and dry  Any fever type symptoms? No      Drains: None     GI:  Nausea? No  Vomiting? No  BM? No Instructed to increase fluids and use Murelax or dulcolax.   Gas? Yes  Voiding Frequency? 4 or more/day   Appetite? Fair    Activity:  Walking activity? Yes  Frequency/Type: Multiple times per day  Restrictions: x 1 week    Return to Work Plans?  Expected date: 01/12/2022  Do you need anything from us in this regard? No     Post-op appointment made? No          Thank you for your time. Please do not hesitate to call us with any questions or concerns.    Call completed by: Diya Jimenez RN

## 2022-01-06 NOTE — TELEPHONE ENCOUNTER
"Patient calling reporting he had blood draws done on Friday 12/31/21.    Reporting at time of blood draw in right arm he felt \"electrical shock.\" Stating he has had intermittent numbness, pain in right arm.    Pain is from right elbow to right hand. Reporting pain has increased since 12/31/21.    Denies any visible change to arm.     Rating pain a 0-1 at rest.    \"6-7\" with certain movements.    Pain is improved when lifting arm above head, increased with specific movements.     Patient is expressing concern on returning to work as job involves a lot of lifting.       Disposition to be seen today in office.    Transferred to Central Scheduling.     Reviewed Walk In Clinic option.    COVID 19 Nurse Triage Plan/Patient Instructions    Please be aware that novel coronavirus (COVID-19) may be circulating in the community. If you develop symptoms such as fever, cough, or SOB or if you have concerns about the presence of another infection including coronavirus (COVID-19), please contact your health care provider or visit [email or web address].     Disposition/Instructions    In-Person Visit with provider recommended. Reference Visit Selection Guide.    Thank you for taking steps to prevent the spread of this virus.  o Limit your contact with others.  o Wear a simple mask to cover your cough.  o Wash your hands well and often.    Resources    [facility]: About COVID-19: [email or web address]/[email or web address]/    CDC: What to Do If You're Sick: www.cdc.gov/coronavirus/2019-ncov/about/steps-when-sick.html    CDC: Ending Home Isolation: www.cdc.gov/coronavirus/2019-ncov/hcp/disposition-in-home-patients.html     CDC: Caring for Someone: www.cdc.gov/coronavirus/2019-ncov/if-you-are-sick/care-for-someone.html     [facility]: Interim Guidance for Hospital Discharge to Home: www.health.[facility].[place]/diseases/coronavirus/hcp/hospdischarge.pdf    [facility] clinical trials (COVID-19 research studies): " clinicalaffairs.[facility]-clinical-trials     Below are the COVID-19 hotlines at the [place] Department of Health ([facility]). Interpreters are available.   o For health questions: Call 979-414-3312 or 1-724.782.1657 (7 a.m. to 7 p.m.)  o For questions about schools and childcare: Call 580-618-3745 or 1-472.198.3340 (7 a.m. to 7 p.m.)                         Reason for Disposition    Pain or swelling present > 5 days    Additional Information    Negative: Shock suspected (e.g., cold/pale/clammy skin, too weak to stand, low BP, rapid pulse)    Negative: Puncture wound of head, neck, chest, back, or abdomen that sounds life-threatening to triager    Negative: Sounds like a life-threatening emergency to the triager    Negative: Puncture wound of head, neck, chest, abdomen, or overlying a joint and it could be deep    Negative: Needlestick from used or discarded injection needle (Exception: clean, unused needle)    Negative: High pressure injection injury (e.g., from grease gun or paint gun, usually work-related)    Negative: Sounds like a serious injury to the triager    Negative: SEVERE pain (e.g., excruciating)    Negative: Puncture wound of foot and hurts too much to walk on (i.e., unable to bear weight, severe limp)    Negative: Puncture wound of bare foot (no shoes) and setting was dirty    Negative: Puncture wound of foot and puncture went through shoe (e.g., tennis shoe)    Negative: Puncture wound of finger and entire finger swollen    Negative: Puncture wound from sharp object that was very dirty (e.g., barnyard)    Negative: Dirt (debris) can be seen in the wound, not removed with 15 minute scrubbing    Negative: Tip of the object is broken off and missing    Negative: Sensation of something still in the wound    Negative: Looks infected (e.g., spreading redness, pus, red streak)    Protocols used: PUNCTURE WOUND-A-OH

## 2022-01-07 NOTE — PROGRESS NOTES
Problem List Items Addressed This Visit     None      Visit Diagnoses     Pain of right forearm    -  Primary        I suspect that the patient had a needle that perhaps penetrated a tendon which is causing persistent pain. Alternatively, it could have been a nerve. Either way I reassured the patient that this should gradually resolve over the next couple of weeks and that other than over-the-counter Tylenol or ibuprofen I would not recommend any other interventions. The patient was comfortable with monitoring for now.    TREE WILLEM ASIFLAYTON Caballero is a 26 year old who presents today with a concern regarding right arm pain. The patient recently had surgery and reports that when they were attempting to establish an IV in his right arm, he had sudden onset of intense pain and it has persisted since that time. This occurred on January 4 and he reports that the pain has lessened but he still feels it. He reports pain from the elbow down to approximately the wrist. He does not have any symptoms going into his hand. Sometimes he will feel a sense of tingling but has not had any numbness and the symptoms wax and wane. He describes increased pain with certain movements. He denies any swelling or bruising in that area.     Objective    /74 (BP Location: Left arm, Patient Position: Left side, Cuff Size: Adult Large)   Pulse 75   Wt 111.1 kg (245 lb)   SpO2 96%   BMI 32.32 kg/m    Body mass index is 32.32 kg/m .  Physical Exam   GENERAL: healthy, alert and no distress  ARM: normal appearance of the arm with minimal reproducible tenderness

## 2022-01-17 ENCOUNTER — VIRTUAL VISIT (OUTPATIENT)
Dept: FAMILY MEDICINE | Facility: CLINIC | Age: 27
End: 2022-01-17
Payer: COMMERCIAL

## 2022-01-17 DIAGNOSIS — M79.631 PAIN OF RIGHT FOREARM: Primary | ICD-10-CM

## 2022-01-17 DIAGNOSIS — K80.20 CALCULUS OF GALLBLADDER WITHOUT CHOLECYSTITIS WITHOUT OBSTRUCTION: ICD-10-CM

## 2022-01-17 PROCEDURE — 99213 OFFICE O/P EST LOW 20 MIN: CPT | Mod: 95 | Performed by: FAMILY MEDICINE

## 2022-01-17 NOTE — PROGRESS NOTES
"Federico is a 26 year old who is being evaluated via a billable video visit.      How would you like to obtain your AVS? MyChart  If the video visit is dropped, the invitation should be resent by: Text to cell phone: [phone]  Will anyone else be joining your video visit? No    Video Start Time: 1710    Assessment & Plan     Pain of right forearm    This seems to be cleared up for him and he states that it was much worse when he made the appointment but now seems to be clearing up just fine.  We talked about the fact that he may have had a nerve hit when they rachael his blood and that may have bothered him for a few days but it does seem to be improved.    Calculus of gallbladder without cholecystitis without obstruction    He seems to be doing well after his gallbladder surgery.  His bowels have been pretty good.  He was having a bit of discomfort at one of the incision sites but that also seems to be improving he was reassured that both of these things seem to be clearing up and if things turn around or get worse he will let us know.      Review of external notes as documented elsewhere in note         BMI:   Estimated body mass index is 32.32 kg/m  as calculated from the following:    Height as of 1/4/22: 1.854 m (6' 1\").    Weight as of 1/6/22: 111.1 kg (245 lb).           No follow-ups on file.    Lloyd Marie MD  [facility]   Federico is a 26 year old who presents for the following health issues     HPI     Patient on a video call today to discuss a couple of minor issues that have come up for him since his gallbladder surgery recently.  He had a fairly routine gallbladder removal that went well.  He was having a little bit of discomfort at one of the incisions but that seems to be improving.  It was giving him pain when he was stretching but now seems better.    He was more concerned some right forearm pain.  After he had his blood drawn he noticed that he had a " lot of pain going down his forearm and he attributed it to the blood drawn possibly getting a nerve kit or something like that.  It does seem to be getting better howeveConstitutional, HEENT, cardiovascular, pulmonary, gi and gu systems are negative, except as otherwise noted.r.      Review of Systems         Objective           Vitals:  No vitals were obtained today due to virtual visit.    Physical Exam   GENERAL: Healthy, alert and no distress  EYES: Eyes grossly normal to inspection.  No discharge or erythema, or obvious scleral/conjunctival abnormalities.  RESP: No audible wheeze, cough, or visible cyanosis.  No visible retractions or increased work of breathing.    SKIN: Visible skin clear. No significant rash, abnormal pigmentation or lesions.  NEURO: Cranial nerves grossly intact.  Mentation and speech appropriate for age.  PSYCH: Mentation appears normal, affect normal/bright, judgement and insight intact, normal speech and appearance well-groomed.                Video-Visit Details    Type of service:  Video Visit    Video End Time:1725    Originating Location (pt. Location): Home    Distant Location (provider location):  [facility]     Platform used for Video Visit: [employer]

## 2022-01-25 ENCOUNTER — NURSE TRIAGE (OUTPATIENT)
Dept: NURSING | Facility: CLINIC | Age: 27
End: 2022-01-25
Payer: COMMERCIAL

## 2022-01-25 NOTE — TELEPHONE ENCOUNTER
"On Jan 4th had gallbladder removal surgery and all was fine up until 5 days ago.  Patient noticed sharp pain above primary incision point.  Times it would go up to ten with lifting and then subside.  Now pain is on right side of the abdomen.  Pain is still where primary incision was and now is radiating to right side.  If sitting \"0-1\".  If patient coughs or sneezes or lifts go to \"10 for five seconds and then goes back down to zero\".  It seems to be specific movements.  Patient states that for his job he has to lift and patient states that he will call in sick today for work.  Patient states that he will need a note for work.  Patient is requesting a message be sent to Surgeon.  Patient denies fever cough and shortness of breath.  Patient has not tried any over the counter medication for pain.  Please phone Federico.    COVID 19 Nurse Triage Plan/Patient Instructions    Please be aware that novel coronavirus (COVID-19) may be circulating in the community. If you develop symptoms such as fever, cough, or SOB or if you have concerns about the presence of another infection including coronavirus (COVID-19), please contact your health care provider or visit [email or web address].     Disposition/Instructions    Home care recommended. Follow home care protocol based instructions.    Thank you for taking steps to prevent the spread of this virus.  o Limit your contact with others.  o Wear a simple mask to cover your cough.  o Wash your hands well and often.    Resources    [facility]: About COVID-19: www.[facility].org/covid19/    CDC: What to Do If You're Sick: www.cdc.gov/coronavirus/2019-ncov/about/steps-when-sick.html    CDC: Ending Home Isolation: www.cdc.gov/coronavirus/2019-ncov/hcp/disposition-in-home-patients.html     CDC: Caring for Someone: www.cdc.gov/coronavirus/2019-ncov/if-you-are-sick/care-for-someone.html     [facility]: Interim Guidance for Hospital Discharge to Home: " www.health.[facility].[place]/diseases/coronavirus/hcp/hospdischarge.pdf    [facility] clinical trials (COVID-19 research studies): clinicalaffairs.[facility]/umn-clinical-trials     Below are the COVID-19 hotlines at the [place] Department of Health ([facility]). Interpreters are available.   o For health questions: Call 857-457-5325 or 1-258.647.1263 (7 a.m. to 7 p.m.)  o For questions about schools and childcare: Call 203-149-8248 or 1-807.246.1041 (7 a.m. to 7 p.m.)                       Reason for Disposition    MILD TO MODERATE post-op pain (e.g., pain scale 1-7) that is not controlled with pain medications    Additional Information    Negative: Sounds like a life-threatening emergency to the triager    Negative: Bright red, wide-spread, sunburn-like rash    Negative: SEVERE headache and after spinal (epidural) anesthesia    Negative: Vomiting and persists > 4 hours    Negative: Vomiting and abdomen looks much more swollen than usual    Negative: Drinking very little and dehydration suspected (e.g., no urine > 12 hours, very dry mouth, very lightheaded)    Negative: Patient sounds very sick or weak to the triager    Negative: Sounds like a serious complication to the triager    Negative: Fever > 100.4 F (38.0 C)    Negative: Caller has URGENT question and triager unable to answer question    Negative: SEVERE post-op pain (e.g., excruciating, pain scale 8-10) that is not controlled with pain medications    Negative: Headache and after spinal (epidural) anesthesia and not severe    Negative: Fever present > 3 days (72 hours)    Negative: Patient wants to be seen    Protocols used: POST-OP SYMPTOMS AND [id number]

## 2022-02-11 DIAGNOSIS — F41.1 GENERALIZED ANXIETY DISORDER: ICD-10-CM

## 2022-02-11 RX ORDER — ESCITALOPRAM OXALATE 20 MG/1
20 TABLET ORAL DAILY
Qty: 90 TABLET | Refills: 3 | Status: SHIPPED | OUTPATIENT
Start: 2022-02-11 | End: 2023-02-20

## 2022-05-18 NOTE — PROGRESS NOTES
"ASSESSMENT:  1. Generalized anxiety disorder    We discussed options at this point and he agrees to increase his Lexapro to 20 mg once daily.  He can use the alprazolam that was given to him in the emergency room for severe panic attacks that we discussed trying to keep this to once or twice a week if he needs to use more he will let me know.  I would expect that the increase in his Lexapro will help alleviate some of the need for that alprazolam.  Follow-up with him in a few months to make sure that he is improving that his scores are also getting better.    - escitalopram oxalate (LEXAPRO) 20 MG tablet; Take 1 tablet (20 mg total) by mouth daily.  Dispense: 90 tablet; Refill: 3          PLAN:  There are no Patient Instructions on file for this visit.    No orders of the defined types were placed in this encounter.    Medications Discontinued During This Encounter   Medication Reason     escitalopram oxalate (LEXAPRO) 10 MG tablet Reorder       No follow-ups on file.    CHIEF COMPLAINT:  Chief Complaint   Patient presents with     Anxiety     Having a \"spike\" in anxiety - panic attacks - increased over past week - last 2-3 days have been the worst, today not as bad - was seen at [facility] yesterday for this and added ativan prn       HISTORY OF PRESENT ILLNESS:  Federico is a 23 y.o. male presenting to the clinic today for anxiety.  He has been on Lexapro 10 mg a day for some time but recently he has been having more panic attacks.  His panic attacks became so frequent and more difficult that he went into the emergency room about a week ago and gave him some alprazolam.  He reports that that does help but he does not really like taking a lot.  He wonders about increasing or change in the current medication that he takes every day for anxiety.  He thinks it was working but may be just needs a bit higher on the dose that has been on for so long.    REVIEW OF SYSTEMS:     All other systems are " negative.    PFSH:    Reviewed    How difficult did these problems make it for you to do your work, take care of things at home or get along with other people? : Very difficult (9/9/2019  3:00 PM)  Feeling nervous, anxious, or on edge: 3 (9/9/2019  3:00 PM)  Not being able to stop or control worrying: 3 (9/9/2019  3:00 PM)  Worrying too much about different things: 3 (9/9/2019  3:00 PM)  Trouble relaxing: 3 (9/9/2019  3:00 PM)  Being so restless that it's hard to sit still: 3 (9/9/2019  3:00 PM)  Becoming easily annoyed or irritable: 0 (9/9/2019  3:00 PM)  Feeling afraid as if something awful might happen: 3 (9/9/2019  3:00 PM)  MIESHA 7 Total Score: 18 (9/9/2019  3:00 PM)  How difficult did these problems make it for you to do your work, take care of things at home or get along with other people? : Very difficult (9/9/2019  3:00 PM)       TOBACCO USE:  Social History     Tobacco Use   Smoking Status Never Smoker   Smokeless Tobacco Never Used       VITALS:  Vitals:    09/09/19 1410   BP: 108/68   Patient Site: Left Arm   Patient Position: Sitting   Cuff Size: Adult Large   Pulse: 92   SpO2: 97%   Weight: (!) 249 lb (112.9 kg)     Wt Readings from Last 3 Encounters:   09/09/19 (!) 249 lb (112.9 kg)   09/08/19 (!) 246 lb 3.2 oz (111.7 kg)   05/07/19 (!) 241 lb 14.4 oz (109.7 kg)     Body mass index is 32.85 kg/m .    PHYSICAL EXAM:  Constitutional:  Reveals well-appearing male in no acute distress..  Vitals:  Per nursing notes.        Cardiac: Normal is alert oriented interactive and pleasant today. rate and rhythm without murmurs, rubs, or gallops.     Legs normal  Palpation of the radial pulse normal.  Lungs: Clear.  Respiratory effort normal.    Neurologic:  Cranial nerves II-XII intact.     Psychiatric:  Mood appropriate, memory intact.       The visit lasted a total of 20 minutes face to face with the patient. Over 50% of the time was spent counseling and educating the patient about medications, medication  adjustments, medication side effects, and lab testing.        MEDICATIONS:  Current Outpatient Medications   Medication Sig Dispense Refill     escitalopram oxalate (LEXAPRO) 20 MG tablet Take 1 tablet (20 mg total) by mouth daily. 90 tablet 3     LORazepam (ATIVAN) 0.5 MG tablet Take 1 tablet (0.5 mg total) by mouth every 8 (eight) hours as needed for anxiety. 8 tablet 0     No current facility-administered medications for this visit.            Scc Moderately Differentiated Histology Text: There were nests of invasive moderately-differentiated atypical keratinocytes seen.   The area of positive cancer is as marked on the Mohs map.

## 2022-06-25 ENCOUNTER — HEALTH MAINTENANCE LETTER (OUTPATIENT)
Age: 27
End: 2022-06-25

## 2022-08-05 ENCOUNTER — VIRTUAL VISIT (OUTPATIENT)
Dept: FAMILY MEDICINE | Facility: CLINIC | Age: 27
End: 2022-08-05
Payer: COMMERCIAL

## 2022-08-05 DIAGNOSIS — R51.9 ACUTE NONINTRACTABLE HEADACHE, UNSPECIFIED HEADACHE TYPE: Primary | ICD-10-CM

## 2022-08-05 PROCEDURE — 99213 OFFICE O/P EST LOW 20 MIN: CPT | Mod: 95 | Performed by: NURSE PRACTITIONER

## 2022-08-05 NOTE — PROGRESS NOTES
Federico is a 26 year old who is being evaluated via a billable video visit.      How would you like to obtain your AVS? MyChart  If the video visit is dropped, the invitation should be resent by: Text to cell phone: [phone]  Will anyone else be joining your video visit? No          Acute nonintractable headache, unspecified headache type  Differentials include migraine headache, tension headache, cluster headache, allergies, sinusitis, covid 19.  Symptoms are typical of a migraine headache.  Discussed treatment options.  He would like to try over-the-counter ibuprofen at the onset of the headache.  If no improvement in symptoms, may consider sumatriptan as a treatment option.  He will monitor for any triggers.  Discussed the importance of good hydration.  Recommend eye exam if he has not had one recently.  Discussed red flag symptoms including vomiting, numbness and tingling of his extremities, muscular weakness, facial drooping, worsening headache.  If these occur, suggest ER evaluation.  He is content with the plan.        Subjective   Federico is a 26 year old presenting for the following health issues:  Migraine  Patient presents today with concerns of headaches.  3 weeks ago, he developed headache around and within his right eye.  He describes this as a pulsating pain which lasted for approximately 20 minutes.  He was experiencing slight rhinorrhea and his eye was lacrimating.  He denies itchy eyes.  Pain subsided on its own.  He has a history of this occurring once in the past year.  Pain rates his pain 5/10.  He experienced photophobia.  No neck pain was present.  He denies nausea, vomiting, blurry vision, numbness and tingling of his extremities, muscular weakness.  Over the past 2 weeks, he has had a few episodes of milder versions of this.  Pain was 2/10.  He has not been taking any pain medications.  He denies recent cold symptoms or fever.  He denies history of migraines in the past.  He denies added  stress.    Answers for HPI/ROS submitted by the patient on 8/5/2022  Headache Symptoms are: improved  How often are you getting headaches or migraines? : Every few hours, for a couple of weeks at a time.  Are you able to do normal daily activities when you have a migraine?: No  Migraine Rescue/Relief Medications:: no rescue/relief medications  Effectiveness of rescue/relief medications:: The relief is inconsistent  Migraine Preventative Medications:: no medications to prevent migraines  ER or UC Visits:: 0 times  How many servings of fruits and vegetables do you eat daily?: 2-3  On average, how many sweetened beverages do you drink each day (Examples: soda, juice, sweet tea, etc.  Do NOT count diet or artificially sweetened beverages)?: 0  How many days a week do you exercise enough to make your heart beat faster?: 4  How many days per week do you miss taking your medication?: 0      Review of Systems   Constitutional, HEENT, cardiovascular, pulmonary, gi and gu systems are negative, except as otherwise noted.      Objective           Vitals:  No vitals were obtained today due to virtual visit.    Physical Exam   GENERAL: Healthy, alert and no distress  EYES: Eyes grossly normal to inspection.  No discharge or erythema, or obvious scleral/conjunctival abnormalities.  HENT: Normal cephalic/atraumatic.  External ears, nose and mouth without ulcers or lesions.  No nasal drainage visible.  NECK: No asymmetry, visible masses or scars  RESP: No audible wheeze, cough, or visible cyanosis.  No visible retractions or increased work of breathing.    MS: No gross musculoskeletal defects noted.  Normal range of motion.  No visible edema.  SKIN: Visible skin clear. No significant rash, abnormal pigmentation or lesions.  NEURO: Cranial nerves grossly intact.  Mentation and speech appropriate for age.  PSYCH: Mentation appears normal, affect normal/bright, judgement and insight intact, normal speech and appearance  well-groomed.          Video-Visit Details    Video Start Time: 10:31 AM    Type of service:  Video Visit    Video End Time:10:38 AM    Originating Location (pt. Location): Home    Distant Location (provider location):  [facility]     Platform used for Video Visit: Zully

## 2022-09-09 ENCOUNTER — OFFICE VISIT (OUTPATIENT)
Dept: INTERNAL MEDICINE | Facility: CLINIC | Age: 27
End: 2022-09-09
Payer: COMMERCIAL

## 2022-09-09 VITALS
HEART RATE: 75 BPM | SYSTOLIC BLOOD PRESSURE: 116 MMHG | DIASTOLIC BLOOD PRESSURE: 76 MMHG | OXYGEN SATURATION: 96 % | WEIGHT: 257.9 LBS | BODY MASS INDEX: 34.03 KG/M2

## 2022-09-09 DIAGNOSIS — G93.32 POST-COVID CHRONIC FATIGUE: ICD-10-CM

## 2022-09-09 DIAGNOSIS — U09.9 POST-COVID CHRONIC FATIGUE: ICD-10-CM

## 2022-09-09 DIAGNOSIS — R05.9 COUGH: ICD-10-CM

## 2022-09-09 PROCEDURE — 99213 OFFICE O/P EST LOW 20 MIN: CPT | Mod: CS | Performed by: NURSE PRACTITIONER

## 2022-09-09 NOTE — PROGRESS NOTES
"  Assessment & Plan     Cough/Post-COVID chronic fatigue: Exam was benign, lungs are clear. Discussed that recovering from COVID-19 will take time. Discussed resting, pushing fluids, follow up in another month if symptoms persist.     BMI:   Estimated body mass index is 34.03 kg/m  as calculated from the following:    Height as of 1/4/22: 1.854 m (6' 1\").    Weight as of this encounter: 117 kg (257 lb 14.4 oz).     Return if symptoms worsen or fail to improve.    Jannet Lawrence CNP  [facility]    Lonnie Caballero is a 26 year old, presenting for the following health issues:  Lingering Covid Symptoms  (Diagnosed with Covid Last Month, Has Lingering Symptoms Wants Checked Out (Cough & Fatigue))      History of Present Illness       Reason for visit:  Long Covid symptoms  Symptom onset:  3-4 weeks ago  Symptoms include:  Fatigue, cough, conjestion  Symptom intensity:  Mild  Symptom progression:  Improving  Had these symptoms before:  No  What makes it worse:  No  What makes it better:  No    He eats 2-3 servings of fruits and vegetables daily.He consumes 1 sweetened beverage(s) daily.He exercises with enough effort to increase his heart rate 20 to 29 minutes per day.  He exercises with enough effort to increase his heart rate 5 days per week.   He is taking medications regularly.       The patient presents today for concerns of possible long haul COVID.    He tested positive 08/14/22. He reports that his initial symptoms were fever., cough, body aches, fatigue, and loss of smell, but not taste.    He reports still having a dry cough that comes and goes, as well as fatigue.    He denies a fever, chills, shortness of breath, chest pain, or chest pressure.    He denies other concerns today.   Review of Systems   Constitutional, HEENT, cardiovascular, pulmonary, GI, , musculoskeletal, neuro, skin, endocrine and psych systems are negative, except as otherwise noted.    "   Objective    /76   Pulse 75   Wt 117 kg (257 lb 14.4 oz)   SpO2 96%   BMI 34.03 kg/m    Body mass index is 34.03 kg/m .  Physical Exam   GENERAL: healthy, alert and no distress  EYES: Eyes grossly normal to inspection, PERRL and conjunctivae and sclerae normal  HENT: ear canals and TM's normal, nose and mouth without ulcers or lesions  NECK: no adenopathy, no asymmetry, masses, or scars and thyroid normal to palpation  RESP: lungs clear to auscultation - no rales, rhonchi or wheezes  CV: regular rate and rhythm, normal S1 S2, no S3 or S4, no murmur, click or rub, no peripheral edema and peripheral pulses strong  MS: no gross musculoskeletal defects noted, no edema  SKIN: no suspicious lesions or rashes  NEURO: Normal strength and tone, mentation intact and speech normal  PSYCH: mentation appears normal, affect normal/bright

## 2022-09-09 NOTE — PATIENT INSTRUCTIONS
Continue to rest, push fluids.    Start a daily Zyrtec or Cetirizine over the counter for your allergies.    Give it time.    Follow up if symptoms persist or worsen.

## 2022-11-20 ENCOUNTER — HEALTH MAINTENANCE LETTER (OUTPATIENT)
Age: 27
End: 2022-11-20

## 2022-11-21 ENCOUNTER — OFFICE VISIT (OUTPATIENT)
Dept: FAMILY MEDICINE | Facility: CLINIC | Age: 27
End: 2022-11-21
Payer: COMMERCIAL

## 2022-11-21 ENCOUNTER — NURSE TRIAGE (OUTPATIENT)
Dept: NURSING | Facility: CLINIC | Age: 27
End: 2022-11-21

## 2022-11-21 VITALS
WEIGHT: 268.3 LBS | DIASTOLIC BLOOD PRESSURE: 78 MMHG | TEMPERATURE: 98.8 F | HEIGHT: 78 IN | OXYGEN SATURATION: 94 % | SYSTOLIC BLOOD PRESSURE: 118 MMHG | HEART RATE: 82 BPM | BODY MASS INDEX: 31.04 KG/M2 | RESPIRATION RATE: 14 BRPM

## 2022-11-21 DIAGNOSIS — J02.9 SORE THROAT: Primary | ICD-10-CM

## 2022-11-21 LAB
DEPRECATED S PYO AG THROAT QL EIA: NEGATIVE
GROUP A STREP BY PCR: NOT DETECTED

## 2022-11-21 PROCEDURE — 87651 STREP A DNA AMP PROBE: CPT | Performed by: FAMILY MEDICINE

## 2022-11-21 PROCEDURE — 99213 OFFICE O/P EST LOW 20 MIN: CPT | Performed by: FAMILY MEDICINE

## 2022-11-21 ASSESSMENT — ENCOUNTER SYMPTOMS
FEVER: 1
SORE THROAT: 1
COUGH: 1

## 2022-11-21 NOTE — TELEPHONE ENCOUNTER
"\"I'm a little bit perplexed here. Last Tuesday I started have some cold like symptoms with cough, nasal congestion, and fever.\" Federico reports a \"flare up in my lymph nodes under chin.\" Patient denies breathing difficulty, chest pain/ pressure, fever, or severe pain. Patient is reporting mild to moderate lymph node swelling, moderate right ear pain, and mild sore throat. Patient has been drinking fluids without difficulty. Otherwise, Federico is awake, alert, and responding appropriately.     Triage guidelines recommend to see a provider within 24 hours. FNA RN provided care advice per protocol and advised to call back with any changes, worsening of symptoms, and questions or concerns. Federico verbalized understanding of and agreement with plan and had no further questions. RN warm transferred patient to Central Scheduling to schedule an appointment.     Reason for Disposition    Cough    Earache also present    Additional Information    Negative: SEVERE difficulty breathing (e.g., struggling for each breath, speaks in single words)    Negative: Bluish (or gray) lips or face now    Negative: [1] Difficulty breathing AND [2] exposure to flames, smoke, or fumes    Negative: [1] Stridor AND [2] difficulty breathing    Negative: Sounds like a life-threatening emergency to the triager    Negative: [1] MODERATE difficulty breathing (e.g., speaks in phrases, SOB even at rest, pulse 100-120) AND [2] still present when not coughing    Negative: Chest pain  (Exception: MILD central chest pain, present only when coughing)    Negative: Patient sounds very sick or weak to the triager    Negative: [1] MILD difficulty breathing (e.g., minimal/no SOB at rest, SOB with walking, pulse <100) AND [2] still present when not coughing    Negative: [1] Coughed up blood AND [2] > 1 tablespoon (15 ml)  (Exception: Blood-tinged sputum.)    Negative: Fever > 103 F (39.4 C)    Negative: [1] Fever > 101 F (38.3 C) AND [2] age > 60 years    " Negative: [1] Fever > 100.0 F (37.8 C) AND [2] bedridden (e.g., nursing home patient, CVA, chronic illness, recovering from surgery)    Negative: [1] Fever > 100.0 F (37.8 C) AND [2] diabetes mellitus or weak immune system (e.g., HIV positive, cancer chemo, splenectomy, organ transplant, chronic steroids)    Negative: Wheezing is present    Negative: SEVERE coughing spells (e.g., whooping sound after coughing, vomiting after coughing)    Negative: [1] Continuous (nonstop) coughing interferes with work or school AND [2] no improvement using cough treatment per Care Advice    Negative: Coughing up jonathan-colored (reddish-brown) sputum    Negative: Fever present > 3 days (72 hours)    Negative: [1] Fever returns after gone for over 24 hours AND [2] symptoms worse or not improved    Negative: [1] Using nasal washes and pain medicine > 24 hours AND [2] sinus pain (around cheekbone or eye) persists    Negative: Earache    Negative: [1] Known COPD or other severe lung disease (i.e., bronchiectasis, cystic fibrosis, lung surgery) AND [2] worsening symptoms (i.e., increased sputum purulence or amount, increased breathing difficulty    Negative: Cough has been present for > 3 weeks    Negative: [1] Nasal discharge AND [2] present > 10 days    Negative: [1] Coughed up blood-tinged sputum AND [2] more than once    Negative: Exposure to TB (Tuberculosis)    Negative: SEVERE difficulty breathing (e.g., struggling for each breath, speaks in single words, stridor)    Negative: Sounds like a life-threatening emergency to the triager    Negative: [1] Drooling or spitting out saliva (because can't swallow) AND [2] normal breathing    Negative: Unable to open mouth completely    Negative: [1] Difficulty breathing AND [2] not severe    Negative: Fever > 104 F (40 C)    Negative: [1] Refuses to drink anything AND [2] for > 12 hours    Negative: [1] Drinking very little AND [2] dehydration suspected (e.g., no urine > 12 hours, very dry  "mouth, very lightheaded)    Negative: Patient sounds very sick or weak to the triager    Negative: SEVERE (e.g., excruciating) throat pain    Negative: [1] Pus on tonsils (back of throat) AND [2]  fever AND [3] swollen neck lymph nodes (\"glands\")    Negative: [1] Rash AND [2] widespread (especially chest and abdomen)    Protocols used: COUGH - ACUTE [id number], SORE THROAT-A-AH    Katie Del Rosario RN-BSN  [facility]   "

## 2022-11-21 NOTE — PROGRESS NOTES
Assessment & Plan     Sore throat  Rapid strep was negative.  I have recommended to continue with hydration, nutrition, and other supportive cares.  Follow-up if he has further questions or concerns or if his symptoms persist  - Streptococcus A Rapid Screen w/Reflex to PCR - Clinic Collect  - Group A Streptococcus PCR Throat Swab      Oanh Danielle MD  [facility]    Lonnie Caballero is a 27 year old presenting for the following health issues:  Cough, Pharyngitis, Fever, Ear Problem, and Mass      Cough  Associated symptoms include sore throat.   Pharyngitis   Associated symptoms include cough.   Fever  Associated symptoms include coughing, a fever and a sore throat.   History of Present Illness       Reason for visit:  Swollen glands  Symptom onset:  3-7 days ago  Symptom intensity:  Mild  Symptom progression:  Staying the same  Had these symptoms before:  Yes  Has tried/received treatment for these symptoms:  Yes  Previous treatment was successful:  Yes  Prior treatment description:  Antibiotics  What makes it worse:  No  What makes it better:  Not sure    He eats 2-3 servings of fruits and vegetables daily.He consumes 1 sweetened beverage(s) daily.He exercises with enough effort to increase his heart rate 9 or less minutes per day.  He exercises with enough effort to increase his heart rate 5 days per week.   He is taking medications regularly.       Acute Illness  Acute illness concerns: Sore throat/ swollen lymph nodes, started with fever, headache, cough, all have subsided except the sore throat  Onset/Duration: started a week ago  Symptoms:  Fever: YES  Chills/Sweats: No  Headache (location?): No  Sinus Pressure: No  Conjunctivitis:  No  Ear Pain: YES: both  Rhinorrhea: not now, but did have nasal drainage.  Congestion: YES  Sore Throat: YES  Cough: no  Wheeze: No  Decreased Appetite: no   Nausea: No  Vomiting: No  Diarrhea: YES  Dysuria/Freq.: No  Dysuria  "or Hematuria: No  Fatigue/Achiness: YES  Sick/Strep Exposure: No  Therapies tried and outcome: None    Had 2 negative covid home tests  Vaccinated for strep & influenza    Review of Systems   Constitutional: Positive for fever.   HENT: Positive for sore throat.    Respiratory: Positive for cough.           Objective    /78 (BP Location: Left arm, Patient Position: Sitting, Cuff Size: Adult Large)   Pulse 82   Temp 98.8  F (37.1  C) (Oral)   Resp 14   Ht 1.994 m (6' 6.5\")   Wt 121.7 kg (268 lb 4.8 oz)   SpO2 94%   BMI 30.61 kg/m    Body mass index is 30.61 kg/m .  Physical Exam       Objective:    Physical Exam   /78 (BP Location: Left arm, Patient Position: Sitting, Cuff Size: Adult Large)   Pulse 82   Temp 98.8  F (37.1  C) (Oral)   Resp 14   Ht 1.994 m (6' 6.5\")   Wt 121.7 kg (268 lb 4.8 oz)   SpO2 94%   BMI 30.61 kg/m     Constitutional: Patient is oriented to person, place, and time. Patient appears well-developed and well-nourished. No distress.   Head: Normocephalic and atraumatic.   Right Ear: External ear normal. Normal TM  Left Ear: External ear normal. Normal TM  Eyes: Conjunctivae and EOM are normal. Pupils are equal, round, and reactive to light. Right eye exhibits no discharge. Left eye exhibits no discharge. No scleral icterus.   Neck: Neck supple. No JVD present. No tracheal deviation present. No thyromegaly present. Mouth: Peritonsillar erythema  Cardiovascular: Normal rate, regular rhythm, normal heart sounds and intact distal pulses. No murmur heard.   Pulmonary/Chest: Effort normal and breath sounds normal. No stridor. No respiratory distress. Patient has no wheezes, no rales, exhibits no tenderness.   Lymphadenopathy:  Patient has right sided cervical adenopathy.   Skin: Skin is warm and dry. No rash noted. Patient is not diaphoretic. No erythema. No pallor.           "

## 2022-12-13 ENCOUNTER — NURSE TRIAGE (OUTPATIENT)
Dept: NURSING | Facility: CLINIC | Age: 27
End: 2022-12-13

## 2022-12-13 NOTE — TELEPHONE ENCOUNTER
Triage Call    Pt calling with report of URI Cold symptoms and a productive cough the past 2 days.  Says he feels like he has a low grade fever but no thermometer to check.    Denies breathing problems or chest pain except mild mid chest pain during cough which he said is very tolerable.      Triaged to disposition of Home Care.  Started to Give Care Advice and pt ended call.      Bibi Red RN      Reason for Disposition    Cough with cold symptoms (e.g., runny nose, postnasal drip, throat clearing)    Additional Information    Negative: SEVERE difficulty breathing (e.g., struggling for each breath, speaks in single words)    Negative: Bluish (or gray) lips or face now    Negative: [1] Difficulty breathing AND [2] exposure to flames, smoke, or fumes    Negative: [1] Stridor AND [2] difficulty breathing    Negative: Sounds like a life-threatening emergency to the triager    Negative: [1] Previous asthma attacks AND [2] this feels like asthma attack    Negative: Dry cough (non-productive;  no sputum or minimal clear sputum)    Negative: [1] MODERATE difficulty breathing (e.g., speaks in phrases, SOB even at rest, pulse 100-120) AND [2] still present when not coughing    Negative: Chest pain  (Exception: MILD central chest pain, present only when coughing)    Negative: Patient sounds very sick or weak to the triager    Negative: [1] MILD difficulty breathing (e.g., minimal/no SOB at rest, SOB with walking, pulse <100) AND [2] still present when not coughing    Negative: [1] Coughed up blood AND [2] > 1 tablespoon (15 ml)  (Exception: Blood-tinged sputum.)    Negative: Fever > 103 F (39.4 C)    Negative: [1] Fever > 101 F (38.3 C) AND [2] age > 60 years    Negative: [1] Fever > 100.0 F (37.8 C) AND [2] bedridden (e.g., nursing home patient, CVA, chronic illness, recovering from surgery)    Negative: [1] Fever > 100.0 F (37.8 C) AND [2] diabetes mellitus or weak immune system (e.g., HIV positive, cancer chemo,  splenectomy, organ transplant, chronic steroids)    Negative: Wheezing is present    Negative: SEVERE coughing spells (e.g., whooping sound after coughing, vomiting after coughing)    Negative: [1] Continuous (nonstop) coughing interferes with work or school AND [2] no improvement using cough treatment per Care Advice    Negative: Coughing up jonathan-colored (reddish-brown) sputum    Negative: Fever present > 3 days (72 hours)    Negative: [1] Fever returns after gone for over 24 hours AND [2] symptoms worse or not improved    Negative: [1] Using nasal washes and pain medicine > 24 hours AND [2] sinus pain (around cheekbone or eye) persists    Negative: Earache    Negative: [1] Known COPD or other severe lung disease (i.e., bronchiectasis, cystic fibrosis, lung surgery) AND [2] worsening symptoms (i.e., increased sputum purulence or amount, increased breathing difficulty    Negative: Cough has been present for > 3 weeks    Negative: [1] Nasal discharge AND [2] present > 10 days    Negative: [1] Coughed up blood-tinged sputum AND [2] more than once    Negative: Exposure to TB (Tuberculosis)    Negative: Cough    Protocols used: COUGH - ACUTE [id number]

## 2023-02-19 ENCOUNTER — NURSE TRIAGE (OUTPATIENT)
Dept: NURSING | Facility: CLINIC | Age: 28
End: 2023-02-19

## 2023-02-19 DIAGNOSIS — F41.1 GENERALIZED ANXIETY DISORDER: ICD-10-CM

## 2023-02-19 NOTE — TELEPHONE ENCOUNTER
Patient is out of town in [place] until Wednesday, does not have any refills left and is out of Lexapro, last took on Thursday 2/16   Would like enough for a week if possible until he can get back and be seen    Send to   [facility] in  [employer]   [address], [place] [address]  Routing refill request to provider for review/approval because:  Patient does not have established PCP, last PCP is no longer with [facility]    Last Written Prescription Date:  2/11/222  Last Fill Quantity: 90,  # refills: 3   Last office visit provider:  11/21/2022    Lexapro 20 mg        Nadine Nails RN 02/19/23 12:42 PM           Reason for Disposition    [1] Prescription refill request for NON-ESSENTIAL medicine (i.e., no harm to patient if med not taken) AND [2] triager unable to refill per department policy    Additional Information    Negative: New-onset or worsening symptoms, see that guideline (e.g., diarrhea, runny nose, sore throat)    Negative: Medicine question not related to refill or renewal    Negative: Caller (e.g., patient or pharmacist) requesting information about a new medicine    Negative: Caller requesting information unrelated to medicine    Negative: Caller requesting a CONTROLLED substance prescription refill (e.g., narcotics, ADHD medicines)    Negative: Prescription request for new medicine (not a refill)    Negative: [1] Prescription refill request for ESSENTIAL medicine (i.e., likelihood of harm to patient if not taken) AND [2] triager unable to refill per department policy    Negative: [1] Prescription not at pharmacy AND [2] was prescribed by PCP recently  (Exception: triager has access to EMR and prescription is recorded there. Go to Home Care and confirm for pharmacy.)    Negative: [1] Pharmacy calling with prescription questions AND [2] triager unable to answer question    Protocols used: MEDICATION REFILL AND RENEWAL CALL-A-

## 2023-02-20 RX ORDER — ESCITALOPRAM OXALATE 20 MG/1
20 TABLET ORAL DAILY
Qty: 90 TABLET | Refills: 0 | Status: SHIPPED | OUTPATIENT
Start: 2023-02-20 | End: 2023-03-08

## 2023-02-20 NOTE — TELEPHONE ENCOUNTER
Called patient and assisted in scheduling an office visit to establish care with a new provider.  Patient was a Dr. Torres patient.  Patient will be seen at [place] on 3/8/2023 and is requesting medication until he can be seen.    Routing refill request to Medical Director for review/approval because:  Bridge medication until seen on 3/8/2023    Last Written Prescription Date:  2/11/2022  Last Fill Quantity: 90,  # refills: 3   Last office visit provider:  11/21/2022 - Dr. Torres  Future office visit: 3/8/2023 with Chuy Kumar at [place].    Requested Prescriptions   Pending Prescriptions Disp Refills     escitalopram (LEXAPRO) 20 MG tablet 90 tablet 3     Sig: Take 1 tablet (20 mg) by mouth daily       There is no refill protocol information for this order          Aracelis Forman RN 02/20/23 9:50 AM

## 2023-02-20 NOTE — TELEPHONE ENCOUNTER
Pt called back to see if Medication request has been sent to his pharmacy? TC advised Pt that his message was sent to the provider at 9:52am and to allow a few hours for the request to reach the provider (Dr Jose Enrique May). Please call the Pt when the Rx has been sent (if approved).      Moon hSeikh

## 2023-03-08 ENCOUNTER — OFFICE VISIT (OUTPATIENT)
Dept: FAMILY MEDICINE | Facility: CLINIC | Age: 28
End: 2023-03-08
Payer: COMMERCIAL

## 2023-03-08 VITALS
WEIGHT: 270.9 LBS | TEMPERATURE: 97.9 F | SYSTOLIC BLOOD PRESSURE: 110 MMHG | RESPIRATION RATE: 16 BRPM | HEIGHT: 74 IN | HEART RATE: 77 BPM | OXYGEN SATURATION: 97 % | DIASTOLIC BLOOD PRESSURE: 80 MMHG | BODY MASS INDEX: 34.77 KG/M2

## 2023-03-08 DIAGNOSIS — F98.8 ATTENTION DEFICIT DISORDER, UNSPECIFIED HYPERACTIVITY PRESENCE: ICD-10-CM

## 2023-03-08 DIAGNOSIS — F41.1 GENERALIZED ANXIETY DISORDER: Primary | ICD-10-CM

## 2023-03-08 DIAGNOSIS — Z23 NEED FOR PROPHYLACTIC VACCINATION WITH TETANUS-DIPHTHERIA (TD): ICD-10-CM

## 2023-03-08 PROBLEM — K80.20 CALCULUS OF GALLBLADDER WITHOUT CHOLECYSTITIS WITHOUT OBSTRUCTION: Status: RESOLVED | Noted: 2021-12-08 | Resolved: 2023-03-08

## 2023-03-08 PROCEDURE — 90471 IMMUNIZATION ADMIN: CPT | Performed by: NURSE PRACTITIONER

## 2023-03-08 PROCEDURE — 90714 TD VACC NO PRESV 7 YRS+ IM: CPT | Performed by: NURSE PRACTITIONER

## 2023-03-08 PROCEDURE — 99214 OFFICE O/P EST MOD 30 MIN: CPT | Mod: 25 | Performed by: NURSE PRACTITIONER

## 2023-03-08 RX ORDER — DEXTROAMPHETAMINE SACCHARATE, AMPHETAMINE ASPARTATE MONOHYDRATE, DEXTROAMPHETAMINE SULFATE AND AMPHETAMINE SULFATE 3.75; 3.75; 3.75; 3.75 MG/1; MG/1; MG/1; MG/1
15 CAPSULE, EXTENDED RELEASE ORAL DAILY
Qty: 30 CAPSULE | Refills: 0 | Status: SHIPPED | OUTPATIENT
Start: 2023-03-08 | End: 2023-04-07

## 2023-03-08 RX ORDER — DEXTROAMPHETAMINE SACCHARATE, AMPHETAMINE ASPARTATE MONOHYDRATE, DEXTROAMPHETAMINE SULFATE AND AMPHETAMINE SULFATE 3.75; 3.75; 3.75; 3.75 MG/1; MG/1; MG/1; MG/1
15 CAPSULE, EXTENDED RELEASE ORAL DAILY
Qty: 30 CAPSULE | Refills: 0 | Status: SHIPPED | OUTPATIENT
Start: 2023-05-09 | End: 2023-06-08

## 2023-03-08 RX ORDER — DEXTROAMPHETAMINE SACCHARATE, AMPHETAMINE ASPARTATE MONOHYDRATE, DEXTROAMPHETAMINE SULFATE AND AMPHETAMINE SULFATE 3.75; 3.75; 3.75; 3.75 MG/1; MG/1; MG/1; MG/1
15 CAPSULE, EXTENDED RELEASE ORAL DAILY
Qty: 30 CAPSULE | Refills: 0 | Status: SHIPPED | OUTPATIENT
Start: 2023-04-08 | End: 2023-05-08

## 2023-03-08 RX ORDER — ESCITALOPRAM OXALATE 20 MG/1
20 TABLET ORAL DAILY
Qty: 90 TABLET | Refills: 3 | Status: SHIPPED | OUTPATIENT
Start: 2023-03-08 | End: 2023-07-11

## 2023-03-08 ASSESSMENT — PATIENT HEALTH QUESTIONNAIRE - PHQ9
5. POOR APPETITE OR OVEREATING: NOT AT ALL
SUM OF ALL RESPONSES TO PHQ QUESTIONS 1-9: 2

## 2023-03-08 ASSESSMENT — ANXIETY QUESTIONNAIRES
6. BECOMING EASILY ANNOYED OR IRRITABLE: NOT AT ALL
IF YOU CHECKED OFF ANY PROBLEMS ON THIS QUESTIONNAIRE, HOW DIFFICULT HAVE THESE PROBLEMS MADE IT FOR YOU TO DO YOUR WORK, TAKE CARE OF THINGS AT HOME, OR GET ALONG WITH OTHER PEOPLE: SOMEWHAT DIFFICULT
2. NOT BEING ABLE TO STOP OR CONTROL WORRYING: NOT AT ALL
1. FEELING NERVOUS, ANXIOUS, OR ON EDGE: NOT AT ALL
7. FEELING AFRAID AS IF SOMETHING AWFUL MIGHT HAPPEN: NOT AT ALL
GAD7 TOTAL SCORE: 0
GAD7 TOTAL SCORE: 0
3. WORRYING TOO MUCH ABOUT DIFFERENT THINGS: NOT AT ALL
5. BEING SO RESTLESS THAT IT IS HARD TO SIT STILL: NOT AT ALL

## 2023-03-08 ASSESSMENT — PAIN SCALES - GENERAL: PAINLEVEL: NO PAIN (0)

## 2023-03-08 NOTE — PATIENT INSTRUCTIONS
I sent the Adderall to your pharmacy.  This is the same medication you received years ago.    I also made sure that refills of Lexapro were sent to your pharmacy.    The tetanus shot you got today is good for the next 10 years.    We updated your yearly contract today.  We will get your urine test done next time.    We will call you in 6 months when it is time to follow-up.

## 2023-03-08 NOTE — LETTER
[facility] SANJEEV Berlin  03/08/23  Patient: Federico Bright  YOB: 1995  Medical Record Number: [id number]                                                                                  Non-Opioid Controlled Substance Agreement    This is an agreement between you and your provider regarding safe and appropriate use of controlled substances prescribed by your care team. Controlled substances are?medicines that can cause physical and mental dependence (abuse).     There are strict laws about having and using these medicines. We here at [facility] are  committed to working with you in your efforts to get better. To support you in this work, we'll help you schedule regular office appointments for medicine refills. If we must cancel or change your appointment for any reason, we'll make sure you have enough medicine to last until your next appointment.     As a Provider, I will:     Listen carefully to your concerns while treating you with respect.     Recommend a treatment plan that I believe is in your best interest and may involve therapies other than medicine.      Talk with you often about the possible benefits and the risk of harm of any medicine that we prescribe for you.    Assess the safety of this medicine and check how well it works.      Provide a plan on how to taper (discontinue or go off) using this medicine if the decision is made to stop its use.      ::  As a Patient, I understand controlled substances:       Are prescribed by my care provider to help me function or work and manage my condition(s).?    Are strong medicines and can cause serious side effects.       Need to be taken exactly as prescribed.?Combining controlled substances with certain medicines or chemicals (such as illegal drugs, alcohol, sedatives, sleeping pills, and benzodiazepines) can be dangerous or even fatal.? If I stop taking my medicines suddenly, I may have severe withdrawal symptoms.      The risks, benefits, and side effects of these medicine(s) were explained to me. I agree that:    1. I will take part in other treatments as advised by my care team. This may be psychiatry or counseling, physical therapy, behavioral therapy, group treatment or a referral to specialist.    2. I will keep all my appointments and understand this is part of the monitoring of controlled substances.?My care team may require an office visit for EVERY controlled substance refill. If I miss appointments or don t follow instructions, my care team may stop my medicine    3. I will take my medicines as prescribed. I will not change the dose or schedule unless my care team tells me to. There will be no refills if I run out early.      4. I may be asked to come to the clinic and complete a urine drug test or complete a pill count. If I don t give a urine sample or participate in a pill count, the care team may stop my medicine.    5. I will only receive controlled substance prescriptions from this clinic. If I am treated by another provider, I will tell them that I am taking controlled substances and that I have a treatment agreement with this provider. I will inform my [facility] care team within one business day if I am given a prescription for any controlled substance by another healthcare provider. My [facility] care team can contact other providers and pharmacists about my use of any medicines.    6. It is up to me to make sure that I don't run out of my medicines on weekends or holidays.?If my care team is willing to refill my prescription without a visit, I must request refills only during office hours. Refills may take up to 3 business days to process. I will use one pharmacy to fill all my controlled substance prescriptions. I will notify the clinic about any changes to my insurance or medicine availability.    7. I am responsible for my prescriptions. If the medicine/prescription is lost, stolen or  destroyed, it will not be replaced.?I also agree not to share controlled substance medicines with anyone.     8. I am aware I should not use any illegal or recreational drugs. I agree not to drink alcohol unless my care team says I can.     9. If I enroll in the [place] Medical Cannabis program, I will tell my care team before my next refill.    10. I will tell my care team right away if I become pregnant, have a new medical problem treated outside of my regular clinic, or have a change in my medicines.     11. I understand that this medicine can affect my thinking, judgment and reaction time.? Alcohol and drugs affect the brain and body, which can affect the safety of my driving. Being under the influence of alcohol or drugs can affect my decision-making, behaviors, personal safety and the safety of others. Driving while impaired (DWI) can occur if a person is driving, operating or in physical control of a car, motorcycle, boat, snowmobile, ATV, motorbike, off-road vehicle or any other motor vehicle ([place] Statute 169A.20). I understand the risk if I choose to drive or operate any vehicle or machinery.    I understand that if I do not follow any of the conditions above, my prescriptions or treatment may be stopped or changed.   I agree that my provider, clinic care team and pharmacy may work with any city, state or federal law enforcement agency that investigates the misuse, sale or other diversion of my controlled medicine. I will allow my provider to discuss my care with, or share a copy of, this agreement with any other treating provider, pharmacy or emergency room where I receive care.     I have read this agreement and have asked questions about anything I did not understand.    ________________________________________________________  Patient Signature - Federico Bright     ___________________                   Date     ________________________________________________________  Provider Signature - Chuy RYDER  Koppe, NP       ___________________                   Date     ________________________________________________________  Witness Signature (required if provider not present while patient signing)          ___________________                   Date

## 2023-03-08 NOTE — PROGRESS NOTES
"Assessment & Plan     ICD-10-CM    1. Generalized anxiety disorder  F41.1 escitalopram (LEXAPRO) 20 MG tablet      2. Attention deficit disorder, unspecified hyperactivity presence  F98.8 amphetamine-dextroamphetamine (ADDERALL XR) 15 MG 24 hr capsule     amphetamine-dextroamphetamine (ADDERALL XR) 15 MG 24 hr capsule     amphetamine-dextroamphetamine (ADDERALL XR) 15 MG 24 hr capsule      3. Need for prophylactic vaccination with tetanus-diphtheria (Td)  Z23 TD (ADULT, 7+) PRESERVE FREE        Update tetanus.  Refill of Lexapro sent to the pharmacy.  Discussed different avenues for addressing his ADD.  Previously tried and failed Strattera.  Adderall worked well and patient is interested in restarting.  Plans to primarily take on days he is working.  Reviewed potential side effects.  CSA updated.  We will update UDS once he is on the medication at his follow-up in 6 months.    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    ADHD  Initially was treated with Adderall but patient at that time wasn't a fan of the stimulant idea so Strattera was tried which caused unpleasant side effects.     Anxiety  Currently treated with Lexapro 20 mg. Hasn't needed to try any other medications.  Does have some side effects of fuzzy thinking.  Has done therapy/counseling about 6 years ago.      Review of Systems - negative except for what's listed in the HPI      Objective    /80 (BP Location: Left arm, Patient Position: Sitting, Cuff Size: Adult Large)   Pulse 77   Temp 97.9  F (36.6  C) (Oral)   Resp 16   Ht 1.867 m (6' 1.5\")   Wt 122.9 kg (270 lb 14.4 oz)   SpO2 97%   BMI 35.26 kg/m    Physical Exam   General appearance - alert, well appearing, and in no distress  Mental status - alert, oriented to person, place, and time  Mouth - mucous membranes moist. No oral lesions.  Chest - clear to auscultation, no wheezes, rales or rhonchi, symmetric air " entry  Heart - normal rate and regular rhythm, S1 and S2 normal, no murmurs noted  Abdomen - soft, nontender, nondistended, no masses or organomegaly  Neurological - alert, oriented, normal speech, no focal findings or movement disorder noted, neck supple without rigidity, cranial nerves II through XII intact, motor and sensory grossly normal bilaterally, normal muscle tone, no tremors, strength 5/5  Extremities - peripheral pulses normal, no peripheral edema  Skin - normal coloration and turgor.    Chuy Kumar, CNP    This note has been dictated using voice recognition software. Any grammatical or context distortions are unintentional and inherent to the software.

## 2023-07-02 ENCOUNTER — HEALTH MAINTENANCE LETTER (OUTPATIENT)
Age: 28
End: 2023-07-02

## 2023-07-11 ENCOUNTER — OFFICE VISIT (OUTPATIENT)
Dept: FAMILY MEDICINE | Facility: CLINIC | Age: 28
End: 2023-07-11
Payer: COMMERCIAL

## 2023-07-11 VITALS
BODY MASS INDEX: 34.38 KG/M2 | SYSTOLIC BLOOD PRESSURE: 110 MMHG | WEIGHT: 259.4 LBS | HEART RATE: 101 BPM | HEIGHT: 73 IN | TEMPERATURE: 98.2 F | RESPIRATION RATE: 16 BRPM | DIASTOLIC BLOOD PRESSURE: 76 MMHG | OXYGEN SATURATION: 96 %

## 2023-07-11 DIAGNOSIS — Z11.4 SCREENING FOR HIV (HUMAN IMMUNODEFICIENCY VIRUS): ICD-10-CM

## 2023-07-11 DIAGNOSIS — F41.1 GENERALIZED ANXIETY DISORDER: ICD-10-CM

## 2023-07-11 DIAGNOSIS — Z13.1 SCREENING FOR DIABETES MELLITUS: ICD-10-CM

## 2023-07-11 DIAGNOSIS — R07.9 CHEST PAIN, UNSPECIFIED TYPE: ICD-10-CM

## 2023-07-11 DIAGNOSIS — Z00.00 ROUTINE GENERAL MEDICAL EXAMINATION AT A HEALTH CARE FACILITY: Primary | ICD-10-CM

## 2023-07-11 DIAGNOSIS — Z13.220 LIPID SCREENING: ICD-10-CM

## 2023-07-11 PROCEDURE — 93010 ELECTROCARDIOGRAM REPORT: CPT | Performed by: INTERNAL MEDICINE

## 2023-07-11 PROCEDURE — 36415 COLL VENOUS BLD VENIPUNCTURE: CPT | Performed by: NURSE PRACTITIONER

## 2023-07-11 PROCEDURE — 93005 ELECTROCARDIOGRAM TRACING: CPT | Performed by: NURSE PRACTITIONER

## 2023-07-11 PROCEDURE — 87389 HIV-1 AG W/HIV-1&-2 AB AG IA: CPT | Performed by: NURSE PRACTITIONER

## 2023-07-11 PROCEDURE — 80061 LIPID PANEL: CPT | Performed by: NURSE PRACTITIONER

## 2023-07-11 PROCEDURE — 99395 PREV VISIT EST AGE 18-39: CPT | Performed by: NURSE PRACTITIONER

## 2023-07-11 PROCEDURE — 82947 ASSAY GLUCOSE BLOOD QUANT: CPT | Performed by: NURSE PRACTITIONER

## 2023-07-11 PROCEDURE — 99213 OFFICE O/P EST LOW 20 MIN: CPT | Mod: 25 | Performed by: NURSE PRACTITIONER

## 2023-07-11 RX ORDER — ESCITALOPRAM OXALATE 20 MG/1
20 TABLET ORAL DAILY
Qty: 90 TABLET | Refills: 3 | Status: SHIPPED | OUTPATIENT
Start: 2023-07-11 | End: 2024-08-27

## 2023-07-11 ASSESSMENT — ANXIETY QUESTIONNAIRES
IF YOU CHECKED OFF ANY PROBLEMS ON THIS QUESTIONNAIRE, HOW DIFFICULT HAVE THESE PROBLEMS MADE IT FOR YOU TO DO YOUR WORK, TAKE CARE OF THINGS AT HOME, OR GET ALONG WITH OTHER PEOPLE: NOT DIFFICULT AT ALL
1. FEELING NERVOUS, ANXIOUS, OR ON EDGE: NOT AT ALL
3. WORRYING TOO MUCH ABOUT DIFFERENT THINGS: NOT AT ALL
5. BEING SO RESTLESS THAT IT IS HARD TO SIT STILL: NOT AT ALL
GAD7 TOTAL SCORE: 0
GAD7 TOTAL SCORE: 0
7. FEELING AFRAID AS IF SOMETHING AWFUL MIGHT HAPPEN: NOT AT ALL
6. BECOMING EASILY ANNOYED OR IRRITABLE: NOT AT ALL
2. NOT BEING ABLE TO STOP OR CONTROL WORRYING: NOT AT ALL

## 2023-07-11 ASSESSMENT — ENCOUNTER SYMPTOMS
HEADACHES: 0
DIZZINESS: 0
JOINT SWELLING: 0
ABDOMINAL PAIN: 0
CHILLS: 0
PARESTHESIAS: 0
FEVER: 0
HEARTBURN: 0
DYSURIA: 0
HEMATURIA: 0
CONSTIPATION: 0
ARTHRALGIAS: 0
HEMATOCHEZIA: 0
DIARRHEA: 0
FREQUENCY: 1
WEAKNESS: 0
SHORTNESS OF BREATH: 0
NAUSEA: 0
NERVOUS/ANXIOUS: 0
PALPITATIONS: 1
SORE THROAT: 0
MYALGIAS: 0
COUGH: 0
EYE PAIN: 0

## 2023-07-11 ASSESSMENT — PATIENT HEALTH QUESTIONNAIRE - PHQ9
SUM OF ALL RESPONSES TO PHQ QUESTIONS 1-9: 1
5. POOR APPETITE OR OVEREATING: NOT AT ALL

## 2023-07-11 ASSESSMENT — PAIN SCALES - GENERAL: PAINLEVEL: NO PAIN (0)

## 2023-07-11 NOTE — LETTER
July 17, 2023      Federico Bright  [address] [address]        Dear ,    We are writing to inform you of your test results.    Your bad cholesterol levels are a little better than 3 years ago.  Triglycerides are elevated because they were drawn nonfasting.  Ways that you can lower cholesterol include getting regular vigorous exercise, limiting your intake to 300 mg a day, and getting 25 g of fiber in your diet.     Otherwise blood sugar levels look good.  Your once in a lifetime low risk HIV screen is negative.     Resulted Orders   HIV Antigen Antibody Combo   Result Value Ref Range    HIV Antigen Antibody Combo Nonreactive Nonreactive      Comment:      HIV-1 p24 Ag & HIV-1/HIV-2 Ab Not Detected   Lipid panel   Result Value Ref Range    Cholesterol 256 (H) <200 mg/dL    Triglycerides 353 (H) <150 mg/dL    Direct Measure HDL 35 (L) >=40 mg/dL    LDL Cholesterol Calculated 150 (H) <=100 mg/dL    Non HDL Cholesterol 221 (H) <130 mg/dL    Narrative    Cholesterol  Desirable:  <200 mg/dL    Triglycerides  Normal:  Less than 150 mg/dL  Borderline High:  150-199 mg/dL  High:  200-499 mg/dL  Very High:  Greater than or equal to 500 mg/dL    Direct Measure HDL  Female:  Greater than or equal to 50 mg/dL   Male:  Greater than or equal to 40 mg/dL    LDL Cholesterol  Desirable:  <100mg/dL  Above Desirable:  100-129 mg/dL   Borderline High:  130-159 mg/dL   High:  160-189 mg/dL   Very High:  >= 190 mg/dL    Non HDL Cholesterol  Desirable:  130 mg/dL  Above Desirable:  130-159 mg/dL  Borderline High:  160-189 mg/dL  High:  190-219 mg/dL  Very High:  Greater than or equal to 220 mg/dL   Glucose   Result Value Ref Range    Glucose 94 70 - 99 mg/dL    Patient Fasting > 8hrs? Unknown        If you have any questions or concerns, please call the clinic at the number listed above.       Sincerely,      Chuy Kumar NP

## 2023-07-11 NOTE — PROGRESS NOTES
eSUBJECTIVE:   CC: Federico is an 27 year old who presents for preventative health visit.       7/11/2023     3:15 PM   Additional Questions   Roomed by Evy Rowe CMA     Healthy Habits:     Getting at least 3 servings of Calcium per day:  Yes    Bi-annual eye exam:  Yes    Dental care twice a year:  Yes    Sleep apnea or symptoms of sleep apnea:  None    Diet:  Other    Frequency of exercise:  None    Taking medications regularly:  Yes    Medication side effects:  Not applicable    Additional concerns today:  Yes    Here for non fasting physical    Chest pain    Off and on the last 4 years. Didn't think much of it until his dad was diagnosed with heart failure.  Pains happen monthly. Usually central to right chest.  Rich foods like bbq seem to trigger it. No breathing changes.  No dysphagia.  No sour taste. No diaphoresis. No radiation down the arms.  No fever or chills when this happens.      Social History     Tobacco Use     Smoking status: Never     Smokeless tobacco: Never   Substance Use Topics     Alcohol use: No           7/11/2023     3:04 PM   Alcohol Use   Prescreen: >3 drinks/day or >7 drinks/week? No       Last PSA: No results found for: PSA    Reviewed orders with patient. Reviewed health maintenance and updated orders accordingly - Yes  Lab work is in process    Reviewed and updated as needed this visit by clinical staff   Tobacco  Allergies  Meds              Reviewed and updated as needed this visit by Provider                 Past Medical History:   Diagnosis Date     Anxiety      Asperger's disorder      Depression      Nasal bones, closed fracture     Created by Conversion       Past Surgical History:   Procedure Laterality Date     CHOLANGIOGRAM N/A 01/04/2022    Procedure: CHOLANGIOGRAMS;  Surgeon: Daniel Espinosa MD;  [place] [facility] [place]     LAPAROSCOPIC CHOLECYSTECTOMY N/A 01/04/2022    Procedure: CHOLECYSTECTOMY, LAPAROSCOPIC;  Surgeon: Daniel Espinosa MD;   "Location: [facility]     REMOVAL OF NAIL PLATE      ingrown nail removal     TONSILLECTOMY       WISDOM TOOTH EXTRACTION         Review of Systems   Constitutional: Negative for chills and fever.   HENT: Negative for congestion, ear pain, hearing loss and sore throat.    Eyes: Negative for pain and visual disturbance.   Respiratory: Negative for cough and shortness of breath.    Cardiovascular: Positive for chest pain and palpitations. Negative for peripheral edema.   Gastrointestinal: Negative for abdominal pain, constipation, diarrhea, heartburn, hematochezia and nausea.   Genitourinary: Positive for frequency. Negative for dysuria, genital sores, hematuria and urgency.   Musculoskeletal: Negative for arthralgias, joint swelling and myalgias.   Skin: Negative for rash.   Neurological: Negative for dizziness, weakness, headaches and paresthesias.   Psychiatric/Behavioral: Negative for mood changes. The patient is not nervous/anxious.        OBJECTIVE:   /76 (BP Location: Right arm, Patient Position: Sitting, Cuff Size: Adult Large)   Pulse 101   Temp 98.2  F (36.8  C) (Oral)   Resp 16   Ht 1.854 m (6' 1\")   Wt 117.7 kg (259 lb 6.4 oz)   SpO2 96%   BMI 34.22 kg/m      Physical Exam  GENERAL: healthy, alert and no distress  EYES: Eyes grossly normal to inspection, PERRL and conjunctivae and sclerae normal  HENT: ear canals and TM's normal, nose and mouth without ulcers or lesions  NECK: no adenopathy, no asymmetry, masses, or scars and thyroid normal to palpation  RESP: lungs clear to auscultation - no rales, rhonchi or wheezes  CV: regular rate and rhythm, normal S1 S2, no S3 or S4, no murmur, click or rub, no peripheral edema and peripheral pulses strong  ABDOMEN: soft, nontender, no hepatosplenomegaly, no masses and bowel sounds normal  MS: no gross musculoskeletal defects noted, no edema  SKIN: no suspicious lesions or rashes  NEURO: Normal strength and tone, mentation intact and speech " "normal  PSYCH: mentation appears normal, affect normal/bright    Diagnostic Test Results:  Labs reviewed in Epic    ASSESSMENT/PLAN:       ICD-10-CM    1. Routine general medical examination at a health care facility  Z00.00       2. Screening for HIV (human immunodeficiency virus)  Z11.4 HIV Antigen Antibody Combo     HIV Antigen Antibody Combo      3. Lipid screening  Z13.220 Lipid panel     Lipid panel      4. Screening for diabetes mellitus  Z13.1 Glucose     Glucose     Doing well.  ECG reassuring regarding chest pains.  Given that it occurs after eating rich foods likely GERD.  Check lipids and rule out diabetes.  Utilize antacids.  If other symptoms develop or this becomes a persistent issue, let me know and we can do further cardiac testing like a stress test.  Reviewed healthy lifestyle behaviors.  Continue same Lexapro.    Patient has been advised of split billing requirements and indicates understanding: No      COUNSELING:   Reviewed preventive health counseling, as reflected in patient instructions      BMI:   Estimated body mass index is 34.22 kg/m  as calculated from the following:    Height as of this encounter: 1.854 m (6' 1\").    Weight as of this encounter: 117.7 kg (259 lb 6.4 oz).   Weight management plan: Discussed healthy diet and exercise guidelines      He reports that he has never smoked. He has never used smokeless tobacco.      Chuy Kumar NP  [facility]  "

## 2023-07-11 NOTE — PATIENT INSTRUCTIONS
Some saline nasal spray may help prevent nosebleeds.  If this is too bothersome, let me know and I can connect you with ENT.    EKG looks good!  I suspect reflux as the cause based on the story today.  If it flares up again, use TUMS.  If this becomes a persistent issue we could always start a daily medicine for acid reduction.  If the chest pain of falls, worsens, you have other symptoms associated with it, then we can also do further cardiac testing      Preventive Health Recommendations  Male Ages 26 - 39    Yearly exam:             See your health care provider every year in order to  o   Review health changes.   o   Discuss preventive care.    o   Review your medicines if your doctor has prescribed any.  You should be tested each year for STDs (sexually transmitted diseases), if you re at risk.   After age 35, talk to your provider about cholesterol testing. If you are at risk for heart disease, have your cholesterol tested at least every 5 years.   If you are at risk for diabetes, you should have a diabetes test (fasting glucose).  Shots: Get a flu shot each year. Get a tetanus shot every 10 years.     Nutrition:  Eat at least 5 servings of fruits and vegetables daily.   Eat whole-grain bread, whole-wheat pasta and brown rice instead of white grains and rice.   Get adequate Calcium and Vitamin D.     Lifestyle  Exercise for at least 150 minutes a week (30 minutes a day, 5 days a week). This will help you control your weight and prevent disease.   Limit alcohol to one drink per day.   No smoking.   Wear sunscreen to prevent skin cancer.   See your dentist every six months for an exam and cleaning.

## 2023-07-12 LAB
ATRIAL RATE - MUSE: 86 BPM
CHOLEST SERPL-MCNC: 256 MG/DL
DIASTOLIC BLOOD PRESSURE - MUSE: NORMAL MMHG
FASTING STATUS PATIENT QL REPORTED: NORMAL
GLUCOSE SERPL-MCNC: 94 MG/DL (ref 70–99)
HDLC SERPL-MCNC: 35 MG/DL
INTERPRETATION ECG - MUSE: NORMAL
LDLC SERPL CALC-MCNC: 150 MG/DL
NONHDLC SERPL-MCNC: 221 MG/DL
P AXIS - MUSE: 39 DEGREES
PR INTERVAL - MUSE: 164 MS
QRS DURATION - MUSE: 94 MS
QT - MUSE: 352 MS
QTC - MUSE: 421 MS
R AXIS - MUSE: 75 DEGREES
SYSTOLIC BLOOD PRESSURE - MUSE: NORMAL MMHG
T AXIS - MUSE: 21 DEGREES
TRIGL SERPL-MCNC: 353 MG/DL
VENTRICULAR RATE- MUSE: 86 BPM

## 2023-07-13 LAB — HIV 1+2 AB+HIV1 P24 AG SERPL QL IA: NONREACTIVE

## 2024-02-09 ENCOUNTER — NURSE TRIAGE (OUTPATIENT)
Dept: FAMILY MEDICINE | Facility: CLINIC | Age: 29
End: 2024-02-09
Payer: COMMERCIAL

## 2024-02-09 NOTE — TELEPHONE ENCOUNTER
Nurse Triage SBAR    Is this a 2nd Level Triage? NO    Situation: Patient called in reporting mild recurrent headaches and fatigue.     Background: Patient last seen in clinic by PCP on 7/11/23.     Dx: Asperger's disorder, ADHD, MIESHA.     Taking Lexapro daily.     Patient reports he had these same symptoms happen once about a year and a half ago lasted about a week or two then went away. Did not seek treatment at that time.     Members of family get cluster headaches and migraines, but doesn't seem to be that severe.     Assessment: Patient reports headaches and fatigue started about a week ago.     Patient states headaches occur right above right eye and pulsate and radiate throughout top of right side of skull. Reports headaches last for about 5-10 seconds then resolve. Reports happens 1-2 times within a 10 minute time frame then will happen again a couple of hours later.     Patient reports no headache pain at this time. Reports pain ranges between 2-5/10 during episodes. States headaches do not interfere with daily tasks.     Patient states theses headaches have happened once or twice on the left side.     Patient states he has not taken anything for headache as they resolve.     Patient states he thinks he was a dehydrated so he increased water intake. Reports fatigue has improved since increasing fluid intake, but is still present.     Patient denies fever, stiff neck, vision changes, numbness, tingling, weakness, sore throat, cold symptoms or head injury.     Protocol Recommended Disposition:   See in Office Today or Tomorrow    Recommendation: Patient scheduled for appointment with PCP on 2/13/24. Care advice provided.     Patient advised to call back if increase in headache frequency, duration or intensity or if new symptoms occur. Patient verbalized understanding and is in agreement with plan.     Huddled with PCP to review disposition and PCP in agreement with plan.     Routed to provider    Does the  patient meet one of the following criteria for ADS visit consideration? 16+ years old, with an [facility] PCP     TIP  Providers, please consider if this condition is appropriate for management at one of our Acute and Diagnostic Services sites.     If patient is a good candidate, please use dotphrase <dot>triageresponse and select Refer to ADS to document.      Reason for Disposition   Mild-moderate headache    Additional Information   Negative: Difficult to awaken or acting confused (e.g., disoriented, slurred speech)   Negative: Weakness of the face, arm or leg on one side of the body and new-onset   Negative: Numbness of the face, arm or leg on one side of the body and new-onset   Negative: Loss of speech or garbled speech and new-onset   Negative: Passed out (i.e., fainted, collapsed and was not responding)   Negative: Sounds like a life-threatening emergency to the triager   Negative: Followed a head injury within last 3 days   Negative: Traumatic Brain Injury (TBI) is suspected   Negative: Sinus pain of forehead and yellow or green nasal discharge   Negative: Pregnant   Negative: Unable to walk without falling   Negative: Stiff neck (can't touch chin to chest)   Negative: Possibility of carbon monoxide exposure   Negative: SEVERE headache, states 'worst headache' of life   Negative: SEVERE headache, sudden-onset (i.e., reaching maximum intensity within seconds to 1 hour)   Negative: Severe pain in one eye   Negative: Loss of vision or double vision  (Exception: Same as prior migraines.)   Negative: Patient sounds very sick or weak to the triager   Negative: Fever > 103 F (39.4 C)   Negative: Fever > 100.0 F (37.8 C) and has diabetes mellitus or a weak immune system (e.g., HIV positive, cancer chemotherapy, organ transplant, splenectomy, chronic steroids)   Negative: SEVERE headache (e.g., excruciating) and has had severe headaches before   Negative: SEVERE headache and not relieved by pain meds   Negative: SEVERE  "headache and vomiting   Negative: SEVERE headache and fever   Negative: New headache and weak immune system (e.g., HIV positive, cancer chemo, splenectomy, organ transplant, chronic steroids)   Negative: Fever present > 3 days (72 hours)   Negative: Patient wants to be seen   Negative: Unexplained headache that is present > 24 hours   Negative: New headache and age > 50   Negative: Headache started during exertion (e.g., sex, strenuous exercise, heavy lifting)   Negative: Headache is a chronic symptom (recurrent or ongoing AND lasting > 4 weeks)    Answer Assessment - Initial Assessment Questions  1. LOCATION: \"Where does it hurt?\"       Right above right eye, pulsate and radiate throughout right half of head. Lasts for about 5-10 seconds, happens a couple times within a 10 minute period then happens again in a couple hours.   2. ONSET: \"When did the headache start?\" (Minutes, hours or days)       Started about a week ago, fatigue at the same time  3. PATTERN: \"Does the pain come and go, or has it been constant since it started?\"      Comes and goes  4. SEVERITY: \"How bad is the pain?\" and \"What does it keep you from doing?\"  (e.g., Scale 1-10; mild, moderate, or severe)    - MILD (1-3): doesn't interfere with normal activities     - MODERATE (4-7): interferes with normal activities or awakens from sleep     - SEVERE (8-10): excruciating pain, unable to do any normal activities         At most a 5, usually a 2-3. No headache right now.   5. RECURRENT SYMPTOM: \"Have you ever had headaches before?\" If Yes, ask: \"When was the last time?\" and \"What happened that time?\"       Yes,   6. CAUSE: \"What do you think is causing the headache?\"      Unknown   7. MIGRAINE: \"Have you been diagnosed with migraine headaches?\" If Yes, ask: \"Is this headache similar?\"       No.   8. HEAD INJURY: \"Has there been any recent injury to the head?\"       No.   9. OTHER SYMPTOMS: \"Do you have any other symptoms?\" (fever, stiff neck, eye " "pain, sore throat, cold symptoms)      Eye pain. Denies other   10. PREGNANCY: \"Is there any chance you are pregnant?\" \"When was your last menstrual period?\"        N/a    Protocols used: Headache-A-OH    Ольга Bagley RN  [facility]  "

## 2024-02-13 ENCOUNTER — VIRTUAL VISIT (OUTPATIENT)
Dept: FAMILY MEDICINE | Facility: CLINIC | Age: 29
End: 2024-02-13
Payer: COMMERCIAL

## 2024-02-13 DIAGNOSIS — G43.919 INTRACTABLE MIGRAINE WITHOUT STATUS MIGRAINOSUS, UNSPECIFIED MIGRAINE TYPE: Primary | ICD-10-CM

## 2024-02-13 PROCEDURE — 99213 OFFICE O/P EST LOW 20 MIN: CPT | Mod: 95 | Performed by: NURSE PRACTITIONER

## 2024-02-13 RX ORDER — SUMATRIPTAN 25 MG/1
25 TABLET, FILM COATED ORAL
Qty: 10 TABLET | Refills: 0 | Status: SHIPPED | OUTPATIENT
Start: 2024-02-13

## 2024-02-13 NOTE — PROGRESS NOTES
"Federico is a 28 year old who is being evaluated via a billable video visit.      How would you like to obtain your AVS? MyChart  If the video visit is dropped, the invitation should be resent by: Text to cell phone: [phone]  Will anyone else be joining your video visit? No          Assessment & Plan       ICD-10-CM    1. Intractable migraine without status migrainosus, unspecified migraine type  G43.919 SUMAtriptan (IMITREX) 25 MG tablet        No red flags on exam.  Given unilateral nature, suspect migraine.  Try over-the-counter ibuprofen first.  If failing to improve, try sumatriptan.  Reviewed potential side effects.  Update me if things are worsening/evolving/failing to improve.      BMI  Estimated body mass index is 34.22 kg/m  as calculated from the following:    Height as of 7/11/23: 1.854 m (6' 1\").    Weight as of 7/11/23: 117.7 kg (259 lb 6.4 oz).     Subjective   Federico is a 28 year old, presenting for the following health issues:  Headache (Mild ache around eye with intermittent sharp pains  1.5 wk- had similar issue 1.5 yrs ago)      2/13/2024     8:13 AM   Additional Questions   Roomed by Evy Rowe CMA     HPI     Headache  Around right eye for the past 1.5 weeks. Happened about 1.5 years ago. Didn't get checked out at that time. Pulses across the right side of the head.  Will sometimes travel around the whole head.  Has been more fatigued.  Feels tension over the muscles around his orbit.  No vision changes.   No numbness or tingling. No weakness.  Neck feels fine. Can put chin to chest. No fever or chills. Paternal uncle has a history of cluster headaches vs migraines.  No sinus issues. Hasn't tried anything for pain yet.   Has had 1-2 instances of pain on the left side.  No family history of MS, brain aneurysm, brain mass.          Review of Systems  Constitutional, HEENT, cardiovascular, pulmonary, gi and gu systems are negative, except as otherwise noted.      Objective     "       Vitals:  No vitals were obtained today due to virtual visit.    Physical Exam   GENERAL: alert and no distress  EYES: Eyes grossly normal to inspection.  No discharge or erythema, or obvious scleral/conjunctival abnormalities.  RESP: No audible wheeze, cough, or visible cyanosis.    SKIN: Visible skin clear. No significant rash, abnormal pigmentation or lesions.  NEURO: Cranial nerves grossly intact.  Mentation and speech appropriate for age.  PSYCH: Appropriate affect, tone, and pace of words      Video-Visit Details    Type of service:  Video Visit     Originating Location (pt. Location): Home  Distant Location (provider location):  On-site  Platform used for Video Visit: Zully    Signed Electronically by: Chuy Kumar NP

## 2024-02-22 ENCOUNTER — NURSE TRIAGE (OUTPATIENT)
Dept: NURSING | Facility: CLINIC | Age: 29
End: 2024-02-22
Payer: COMMERCIAL

## 2024-02-22 ENCOUNTER — HOSPITAL ENCOUNTER (EMERGENCY)
Facility: CLINIC | Age: 29
Discharge: HOME | End: 2024-02-23
Attending: EMERGENCY MEDICINE | Admitting: EMERGENCY MEDICINE
Payer: COMMERCIAL

## 2024-02-22 DIAGNOSIS — G43.801 OTHER MIGRAINE WITH STATUS MIGRAINOSUS, NOT INTRACTABLE: ICD-10-CM

## 2024-02-22 DIAGNOSIS — R07.89 CHEST WALL PAIN: ICD-10-CM

## 2024-02-22 PROCEDURE — 99285 EMERGENCY DEPT VISIT HI MDM: CPT | Mod: 25

## 2024-02-22 PROCEDURE — 93005 ELECTROCARDIOGRAM TRACING: CPT | Performed by: EMERGENCY MEDICINE

## 2024-02-22 PROCEDURE — 96374 THER/PROPH/DIAG INJ IV PUSH: CPT

## 2024-02-22 PROCEDURE — 96361 HYDRATE IV INFUSION ADD-ON: CPT

## 2024-02-22 PROCEDURE — 96375 TX/PRO/DX INJ NEW DRUG ADDON: CPT

## 2024-02-22 RX ORDER — KETOROLAC TROMETHAMINE 15 MG/ML
15 INJECTION, SOLUTION INTRAMUSCULAR; INTRAVENOUS ONCE
Status: COMPLETED | OUTPATIENT
Start: 2024-02-23 | End: 2024-02-23

## 2024-02-22 RX ORDER — METOCLOPRAMIDE HYDROCHLORIDE 5 MG/ML
10 INJECTION INTRAMUSCULAR; INTRAVENOUS ONCE
Status: COMPLETED | OUTPATIENT
Start: 2024-02-23 | End: 2024-02-23

## 2024-02-22 ASSESSMENT — COLUMBIA-SUICIDE SEVERITY RATING SCALE - C-SSRS
2. HAVE YOU ACTUALLY HAD ANY THOUGHTS OF KILLING YOURSELF IN THE PAST MONTH?: NO
6. HAVE YOU EVER DONE ANYTHING, STARTED TO DO ANYTHING, OR PREPARED TO DO ANYTHING TO END YOUR LIFE?: NO
1. IN THE PAST MONTH, HAVE YOU WISHED YOU WERE DEAD OR WISHED YOU COULD GO TO SLEEP AND NOT WAKE UP?: NO

## 2024-02-23 ENCOUNTER — APPOINTMENT (OUTPATIENT)
Dept: RADIOLOGY | Facility: CLINIC | Age: 29
End: 2024-02-23
Attending: EMERGENCY MEDICINE
Payer: COMMERCIAL

## 2024-02-23 VITALS
HEART RATE: 86 BPM | RESPIRATION RATE: 12 BRPM | BODY MASS INDEX: 37.03 KG/M2 | DIASTOLIC BLOOD PRESSURE: 58 MMHG | OXYGEN SATURATION: 97 % | WEIGHT: 279.4 LBS | HEIGHT: 73 IN | SYSTOLIC BLOOD PRESSURE: 115 MMHG | TEMPERATURE: 98.6 F

## 2024-02-23 LAB
ALBUMIN SERPL BCG-MCNC: 4.3 G/DL (ref 3.5–5.2)
ALP SERPL-CCNC: 85 U/L (ref 40–150)
ALT SERPL W P-5'-P-CCNC: 24 U/L (ref 0–70)
ANION GAP SERPL CALCULATED.3IONS-SCNC: 11 MMOL/L (ref 7–15)
AST SERPL W P-5'-P-CCNC: 30 U/L (ref 0–45)
BASOPHILS # BLD AUTO: 0.1 10E3/UL (ref 0–0.2)
BASOPHILS NFR BLD AUTO: 1 %
BILIRUB SERPL-MCNC: 0.3 MG/DL
BUN SERPL-MCNC: 13.3 MG/DL (ref 6–20)
CALCIUM SERPL-MCNC: 9.4 MG/DL (ref 8.6–10)
CHLORIDE SERPL-SCNC: 104 MMOL/L (ref 98–107)
CREAT SERPL-MCNC: 1 MG/DL (ref 0.67–1.17)
DEPRECATED HCO3 PLAS-SCNC: 24 MMOL/L (ref 22–29)
EGFRCR SERPLBLD CKD-EPI 2021: >90 ML/MIN/1.73M2
EOSINOPHIL # BLD AUTO: 0.1 10E3/UL (ref 0–0.7)
EOSINOPHIL NFR BLD AUTO: 2 %
ERYTHROCYTE [DISTWIDTH] IN BLOOD BY AUTOMATED COUNT: 12.1 % (ref 10–15)
FLUAV RNA SPEC QL NAA+PROBE: NEGATIVE
FLUBV RNA RESP QL NAA+PROBE: NEGATIVE
GLUCOSE SERPL-MCNC: 84 MG/DL (ref 70–99)
HCT VFR BLD AUTO: 41.1 % (ref 40–53)
HGB BLD-MCNC: 14.1 G/DL (ref 13.3–17.7)
IMM GRANULOCYTES # BLD: 0 10E3/UL
IMM GRANULOCYTES NFR BLD: 1 %
LYMPHOCYTES # BLD AUTO: 3.3 10E3/UL (ref 0.8–5.3)
LYMPHOCYTES NFR BLD AUTO: 42 %
MCH RBC QN AUTO: 29.6 PG (ref 26.5–33)
MCHC RBC AUTO-ENTMCNC: 34.3 G/DL (ref 31.5–36.5)
MCV RBC AUTO: 86 FL (ref 78–100)
MONOCYTES # BLD AUTO: 0.6 10E3/UL (ref 0–1.3)
MONOCYTES NFR BLD AUTO: 8 %
NEUTROPHILS # BLD AUTO: 3.7 10E3/UL (ref 1.6–8.3)
NEUTROPHILS NFR BLD AUTO: 46 %
NRBC # BLD AUTO: 0 10E3/UL
NRBC BLD AUTO-RTO: 0 /100
PLATELET # BLD AUTO: 253 10E3/UL (ref 150–450)
POTASSIUM SERPL-SCNC: 3.7 MMOL/L (ref 3.4–5.3)
PROT SERPL-MCNC: 6.8 G/DL (ref 6.4–8.3)
RBC # BLD AUTO: 4.77 10E6/UL (ref 4.4–5.9)
RSV RNA SPEC NAA+PROBE: NEGATIVE
SARS-COV-2 RNA RESP QL NAA+PROBE: NEGATIVE
SODIUM SERPL-SCNC: 139 MMOL/L (ref 135–145)
TROPONIN T SERPL HS-MCNC: <6 NG/L
WBC # BLD AUTO: 7.8 10E3/UL (ref 4–11)

## 2024-02-23 PROCEDURE — 71046 X-RAY EXAM CHEST 2 VIEWS: CPT

## 2024-02-23 PROCEDURE — 80053 COMPREHEN METABOLIC PANEL: CPT | Performed by: EMERGENCY MEDICINE

## 2024-02-23 PROCEDURE — 84484 ASSAY OF TROPONIN QUANT: CPT | Performed by: EMERGENCY MEDICINE

## 2024-02-23 PROCEDURE — 250N000011 HC RX IP 250 OP 636: Performed by: EMERGENCY MEDICINE

## 2024-02-23 PROCEDURE — 85025 COMPLETE CBC W/AUTO DIFF WBC: CPT | Performed by: EMERGENCY MEDICINE

## 2024-02-23 PROCEDURE — 36415 COLL VENOUS BLD VENIPUNCTURE: CPT | Performed by: EMERGENCY MEDICINE

## 2024-02-23 PROCEDURE — 87637 SARSCOV2&INF A&B&RSV AMP PRB: CPT | Performed by: EMERGENCY MEDICINE

## 2024-02-23 PROCEDURE — 258N000003 HC RX IP 258 OP 636: Performed by: EMERGENCY MEDICINE

## 2024-02-23 RX ADMIN — METOCLOPRAMIDE HYDROCHLORIDE 10 MG: 5 INJECTION INTRAMUSCULAR; INTRAVENOUS at 00:16

## 2024-02-23 RX ADMIN — KETOROLAC TROMETHAMINE 15 MG: 15 INJECTION, SOLUTION INTRAMUSCULAR; INTRAVENOUS at 00:15

## 2024-02-23 RX ADMIN — SODIUM CHLORIDE 1000 ML: 9 INJECTION, SOLUTION INTRAVENOUS at 00:13

## 2024-02-23 ASSESSMENT — ACTIVITIES OF DAILY LIVING (ADL)
ADLS_ACUITY_SCORE: 35
ADLS_ACUITY_SCORE: 35

## 2024-02-23 NOTE — TELEPHONE ENCOUNTER
Chart review completed and patient did go to ER lat night.  Karlie Godwin, JEFFN RN  [facility]

## 2024-02-23 NOTE — ED TRIAGE NOTES
Pt reports a 2 week hx of migraine, and right sided chest/shoulder and arm pain that increases with movement. PT was seen by PMD on video conference on the 13th.  Prescribed 800 mg Ibuprofen, and Imitrex, 25 mg  (which the pt reports he only took once).  Pt also endorses that the migraine is intermittent     Triage Assessment (Adult)       Row Name 02/22/24 5532          Triage Assessment    Airway WDL WDL        Respiratory WDL    Respiratory WDL WDL        Skin Circulation/Temperature WDL    Skin Circulation/Temperature WDL WDL        Cardiac WDL    Cardiac WDL X;chest pain  pt endorses chest pain that increases with movenment . Right sided and right shoulder and arm        Chest Pain Assessment    Chest Pain Location anterior chest, right;shoulder, right  with movement     Chest Pain Radiation arm        Peripheral/Neurovascular WDL    Peripheral Neurovascular WDL WDL        Cognitive/Neuro/Behavioral WDL    Cognitive/Neuro/Behavioral WDL WDL

## 2024-02-23 NOTE — ED PROVIDER NOTES
EMERGENCY DEPARTMENT ENCOUNTER      NAME: Federico Bright  AGE: 28 year old male  YOB: 1995  MRN: [id number]  EVALUATION DATE & TIME: 2/22/2024 11:36 PM    PCP: Chuy Kumar    ED PROVIDER: Paresh Garcia M.D.      Chief Complaint   Patient presents with    Headache    Muscle Pain    Chest Pain     Worse with movement         FINAL IMPRESSION:  1. Other migraine with status migrainosus, not intractable    2. Chest wall pain          ED COURSE & MEDICAL DECISION MAKING:    Pertinent Labs & Imaging studies reviewed. (See chart for details)  28 year old male presents to the Emergency Department for evaluation of headache and chest pain.  Has had a headache intermittently for the last month.  Is right-sided.  Normal neurologic exam.  Seems almost migrainous to me.  No focal deficits.  Do not think he needs head imaging.  Given IV Toradol and IV Reglan in edition IV fluids with complete resolution of his headache.  Is having some right-sided chest pain.  Is been there for over 24 hours.  Seems musculoskeletal.  EKG is negative.  Troponin is negative.  Patient is PERC negative.  No signs of PE.  Chest x-ray does not show any pneumonia pneumothorax or other cause of his chest pain.  No signs of dissection.  Again no focal deficits.  Patient feeling better here.  Afebrile.  Labs otherwise unremarkable.  Discharge home.  Will follow-up with primary next week.  Return for worsening symptoms    11:46  PM I met with the patient to gather history and to perform my initial exam. I discussed the plan for care while in the Emergency Department.     2:09 AM I rechecked and updated the patient.     At the conclusion of the encounter I discussed the results of all of the tests and the disposition. The questions were answered. The patient or family acknowledged understanding and was agreeable with the care plan.     Medical Decision Making  Obtained supplemental history:Supplemental history obtained?: Documented  "in chart  Reviewed external records: External records reviewed?: Documented in chart and Outpatient Record: Patient had a virtual visit at [facility] on 2/13/24  Care impacted by chronic illness:Mental Health  Care significantly affected by social determinants of health:N/A  Did you consider but not order tests?: Work up considered but not performed and documented in chart, if applicable  Did you interpret images independently?: Independent interpretation of ECG and images noted in documentation, when applicable.  Consultation discussion with other provider:Did you involve another provider (consultant, , pharmacy, etc.)?: No  Discharge. No recommendations on prescription strength medication(s). See documentation for any additional details.         MEDICATIONS GIVEN IN THE EMERGENCY:  Medications   sodium chloride 0.9% BOLUS 1,000 mL (0 mLs Intravenous Stopped 2/23/24 0130)   metoclopramide (REGLAN) injection 10 mg (10 mg Intravenous $Given 2/23/24 0016)   ketorolac (TORADOL) injection 15 mg (15 mg Intravenous $Given 2/23/24 0015)       NEW PRESCRIPTIONS STARTED AT TODAY'S ER VISIT  New Prescriptions    No medications on file          =================================================================    HPI    Patient information was obtained from: Patient     Use of : N/A        Federico Bright is a 28 year old male with a pertinent history of anxiety and depression who presents to this ED for evaluation of headaches, chest pain along with shoulder and arm pain.     The patient reports about 2 and 1/2 weeks ago, he started developing severe headaches that continues to today. He denies any hx of migraines. The headaches would start in his right eye and spread from there. Patient states the headaches would fluctuate between a 7 and 8 out of 10. He also states sometimes the headaches would feel like it is \"pounding and radiating\" and that it occurs intermittently. Patient also reports " worsening vision on the right side. The patient had was seen by his primary doctor through a virtual visit on the 13 th and was prescribed 800 mg of ibuprofen and 25 mg of Imitrex. He states these medication did not help or provide any relief. Yesterday, the patient started developing chest pain that occurs with movement. Around 6 pm today, he started endorsing pain that starts on the right shoulder and diffused down his arm. The patient also noticed some pain in his right ankle. Patient's dad has hx of heart disease, which prompt him to come in and get evaluated tonight. No other complaints at this time. No injury, recent travels or sick contact. No drug use and patient does not smoke. No any weakness in his arms or legs. Denies any cough or fever. No other complaints at this time.       PAST MEDICAL HISTORY:  Past Medical History:   Diagnosis Date    Anxiety     Asperger's disorder     Depression     Nasal bones, closed fracture     Created by Conversion        PAST SURGICAL HISTORY:  Past Surgical History:   Procedure Laterality Date    CHOLANGIOGRAM N/A 01/04/2022    Procedure: CHOLANGIOGRAMS;  Surgeon: Daniel Espinosa MD;  Location: [facility] OR    LAPAROSCOPIC CHOLECYSTECTOMY N/A 01/04/2022    Procedure: CHOLECYSTECTOMY, LAPAROSCOPIC;  Surgeon: Daniel Espinosa MD;  Location: [facility] OR    REMOVAL OF NAIL PLATE      ingrown nail removal    TONSILLECTOMY      WISDOM TOOTH EXTRACTION             CURRENT MEDICATIONS:    No current facility-administered medications for this encounter.     Current Outpatient Medications   Medication    escitalopram (LEXAPRO) 20 MG tablet    SUMAtriptan (IMITREX) 25 MG tablet         ALLERGIES:  No Known Allergies    FAMILY HISTORY:  Family History   Problem Relation Age of Onset    Cancer Maternal Grandmother     Cancer Maternal Grandfather     Cancer Paternal Grandmother     Cancer Paternal Grandfather        SOCIAL HISTORY:   Social History  "    Socioeconomic History    Marital status: Single   Tobacco Use    Smoking status: Never    Smokeless tobacco: Never   Vaping Use    Vaping Use: Never used   Substance and Sexual Activity    Alcohol use: No    Drug use: No     Comment: Drug use: Previous use of psychedelics       VITALS:  /58   Pulse 70   Temp 98.6  F (37  C) (Oral)   Resp 21   Ht 1.854 m (6' 1\")   Wt 126.7 kg (279 lb 6.4 oz)   SpO2 97%   BMI 36.86 kg/m      PHYSICAL EXAM    Physical Exam  Vitals and nursing note reviewed.   Constitutional:       General: He is not in acute distress.     Appearance: He is not diaphoretic.   HENT:      Head: Atraumatic.   Eyes:      General: No scleral icterus.     Pupils: Pupils are equal, round, and reactive to light.   Cardiovascular:      Rate and Rhythm: Normal rate and regular rhythm.      Heart sounds: Normal heart sounds.   Pulmonary:      Effort: No respiratory distress.      Breath sounds: Normal breath sounds.   Abdominal:      Palpations: Abdomen is soft.      Tenderness: There is no abdominal tenderness.   Musculoskeletal:         General: No tenderness.   Lymphadenopathy:      Cervical: No cervical adenopathy.   Skin:     General: Skin is warm.      Findings: No rash.   Neurological:      Comments: 5 out of 5 strength in bilateral upper and lower extremities.  Sensation intact in all 4 extremes.  Cranial nerves intact.  No pronator drift               LAB:  All pertinent labs reviewed and interpreted.  Labs Ordered and Resulted from Time of ED Arrival to Time of ED Departure   COMPREHENSIVE METABOLIC PANEL - Normal       Result Value    Sodium 139      Potassium 3.7      Carbon Dioxide (CO2) 24      Anion Gap 11      Urea Nitrogen 13.3      Creatinine 1.00      GFR Estimate >90      Calcium 9.4      Chloride 104      Glucose 84      Alkaline Phosphatase 85      AST 30      ALT 24      Protein Total 6.8      Albumin 4.3      Bilirubin Total 0.3     TROPONIN T, HIGH SENSITIVITY - Normal    " Troponin T, High Sensitivity <6     INFLUENZA A/B, RSV, & SARS-COV2 PCR - Normal    Influenza A PCR Negative      Influenza B PCR Negative      RSV PCR Negative      SARS CoV2 PCR Negative     CBC WITH PLATELETS AND DIFFERENTIAL    WBC Count 7.8      RBC Count 4.77      Hemoglobin 14.1      Hematocrit 41.1      MCV 86      MCH 29.6      MCHC 34.3      RDW 12.1      Platelet Count 253      % Neutrophils 46      % Lymphocytes 42      % Monocytes 8      % Eosinophils 2      % Basophils 1      % Immature Granulocytes 1      NRBCs per 100 WBC 0      Absolute Neutrophils 3.7      Absolute Lymphocytes 3.3      Absolute Monocytes 0.6      Absolute Eosinophils 0.1      Absolute Basophils 0.1      Absolute Immature Granulocytes 0.0      Absolute NRBCs 0.0         RADIOLOGY:  Reviewed all pertinent imaging. Please see official radiology report.  XR Chest 2 Views   Final Result   IMPRESSION: Negative chest.          EKG:    Performed at: 0015  Impression: Normal EKG.  Unchanged from previous dated July 11, 2023  Normal sinus rhythm with a rate of 74.  .  QRS 90.  QTc 421.    I have independently reviewed and interpreted the EKG(s) documented above.    PROCEDURES:         I, Winsome Schumacher, am serving as a scribe to document services personally performed by Dr. Paresh Garcia, based on my observation and the provider's statements to me. I, Paresh Garcia MD attest that Winsome Schumacher is acting in a scribe capacity, has observed my performance of the services and has documented them in accordance with my direction.    Paresh Garcia M.D.  Emergency Medicine  [facility] EMERGENCY ROOM  [address] [id number] [phone]  Dept: [phone]       Paresh Garcia MD  02/23/24 0214

## 2024-02-23 NOTE — TELEPHONE ENCOUNTER
"Migraine sx x 2.5 weeks. I have tried Tylenol, migraine medication: sumatriptan without relief. Yesterday I noticed chest aches: dull ache that is movement based. Today the pain has extended throughout the right side of my body: from the head down the neck, shoulder, chest and into the arm to the hand. Currently pain=\"3-4\".   Video visit for migraine. I have not seen a Dr for the chest sx that began yesterday and today.   I have from broken eyeglasses--between the eyebrows. I wonder about an infection. There was a gash which bled a little bit 1/2\" long, not very deep. It happened 2 days before migraine started. Appears to be healing--not red, no drainage.Current headache pain=\"6\". I am overweight, my father is being treated for CHF.    Triaged to a disposition of Call 911 now. He agrees with this plan. He intends to go to [facility]--advised not to drive himself.     Susu Tafoya RN Triage Nurse Advisor 10:55 PM 2/22/2024  Reason for Disposition   [1] MILD-MODERATE headache AND [2] present > 72 hours   Chest pain lasting longer than 5 minutes and ANY of the following:    history of heart disease  (i.e., heart attack, bypass surgery, angina, angioplasty, CHF; not just a heart murmur)    described as crushing, pressure-like, or heavy    age > 50    age > 30 AND at least one cardiac risk factor (i.e., hypertension, diabetes, obesity, smoker or strong family history of heart disease)    not relieved with nitroglycerin    Additional Information   Negative: Difficult to awaken or acting confused (e.g., disoriented, slurred speech)   Negative: [1] Weakness of the face, arm or leg on one side of the body AND [2] new-onset   Negative: [1] Numbness of the face, arm or leg on one side of the body AND [2] new-onset   Negative: [1] Loss of speech or garbled speech AND [2] new-onset   Negative: Passed out (i.e., lost consciousness, collapsed and was not responding)   Negative: Sounds like a life-threatening emergency to the " "triager   Negative: Followed a head injury   Negative: Pregnant   Negative: Postpartum (from 0 to 6 weeks after delivery)   Negative: Traumatic Brain Injury (TBI) is suspected   Negative: Unable to walk, or can only walk with assistance (e.g., requires support)   Negative: Stiff neck (can't touch chin to chest)   Negative: Severe pain in one eye   Negative: [1] Other family members (or people in same household) with headaches AND [2] possibility of carbon monoxide exposure   Negative: [1] SEVERE headache (e.g., excruciating) AND [2] \"worst headache\" of life   Negative: [1] SEVERE headache AND [2] sudden-onset (i.e., reaching maximum intensity within seconds to 1 hour)   Negative: [1] SEVERE headache AND [2] fever   Negative: Loss of vision or double vision  (Exception: Same as prior migraines.)   Negative: [1] Fever > 100.0 F (37.8 C) AND [2] diabetes mellitus or weak immune system (e.g., HIV positive, cancer chemo, splenectomy, organ transplant, chronic steroids)   Negative: Patient sounds very sick or weak to the triager   Negative: [1] SEVERE headache (e.g., excruciating) AND [2] not improved after 2 hours of pain medicine   Negative: [1] Vomiting AND [2] 2 or more times  (Exception: Similar to previous migraines.)   Negative: Fever > 104 F (40 C)   Negative: [1] MODERATE headache (e.g., interferes with normal activities) AND [2] present > 24 hours AND [3] unexplained  (Exceptions: analgesics not tried, typical migraine, or headache part of viral illness)   Negative: [1] New headache AND [2] weak immune system (e.g., HIV positive, cancer chemo, splenectomy, organ transplant, chronic steroids)   Negative: [1] New headache AND [2] age > 50   Negative: [1] Sinus pain of forehead AND [2] yellow or green nasal discharge   Negative: Fever present > 3 days (72 hours)   Negative: SEVERE difficulty breathing (e.g., struggling for each breath, speaks in single words)   Negative: Difficult to awaken or acting confused " (e.g., disoriented, slurred speech)   Negative: Shock suspected (e.g., cold/pale/clammy skin, too weak to stand, low BP, rapid pulse)   Negative: Passed out (i.e., lost consciousness, collapsed and was not responding)    Protocols used: Headache-A-AH, Chest Pain-A-AH

## 2024-03-29 ENCOUNTER — TELEPHONE (OUTPATIENT)
Dept: FAMILY MEDICINE | Facility: CLINIC | Age: 29
End: 2024-03-29
Payer: COMMERCIAL

## 2024-03-29 ENCOUNTER — MYC MEDICAL ADVICE (OUTPATIENT)
Dept: FAMILY MEDICINE | Facility: CLINIC | Age: 29
End: 2024-03-29
Payer: COMMERCIAL

## 2024-03-29 NOTE — TELEPHONE ENCOUNTER
Spoke to patient and sent telephone encounter to PCP.  Karlie Godwin, JEFFN RN  [facility]

## 2024-03-29 NOTE — TELEPHONE ENCOUNTER
S-(situation): Patient concerned about recent migraine and had palpitations with high heart rate.    B-(background): History of migraines, ADHD, Aspergers, anxiety, palpitations.    A-(assessment): Patient not currently in acute distress.    Yesterday during a migraine had heart rate checked and it was in the 100's, feeling palpitations at that time.  BP was 130s/ 80s.  Assured by writer that it is in the normal range.      He reports that the medications he had been prescribed does not help with the migraines.     Migraines seem to be better - previously rated the pain 5-6/10 and now 2-3/10.  Has a migraine approx every 3-4 hours.  Occasionally has none for a whole day. They last approx 10-15 minutes and go away on their own.    He is having a Brain MRI done on Sunday.      R-(recommendations): Scheduled to be seen by PCP next week after MRI which will be done on Sunday.   Please advise if would like a referral instead of appointment in clinic and will cancel appointment.    JEFF Zabala RN  [facility]

## 2024-03-29 NOTE — TELEPHONE ENCOUNTER
For the migraines, we will see what the MRI says but we should see each other for the palpitations.  May need to get hooked up with a Zio patch.    Chuy Kumar, CNP

## 2024-03-29 NOTE — TELEPHONE ENCOUNTER
Spoke to patient and relayed message from provider.  Patient verbalized understanding and agrees with plan.    JEFF ZabalaN RN  [facility]

## 2024-03-29 NOTE — TELEPHONE ENCOUNTER
Reason for Call:  Appointment Request    Patient requesting this type of appt: Chronic Diease Management/Medication/Follow-Up    Requested provider: Chuy Kumar    Reason patient unable to be scheduled: Not within requested timeframe    When does patient want to be seen/preferred time: 3-7 days    Comments: Pt called stating he has been experiencing the following symptoms: Heart Palpitations, Confirmed High BP, Migraines  I recommended speaking with FNA but Pt declined, as symptoms are not present at this moment, but they come and go.  There were no Appts with PCP in the timeframe Pt was hoping to be seen, and Pt would like a call back to discuss a sooner Appt.  Sending as HP due to nature of symptoms    Could we send this information to you in [employer] or would you prefer to receive a phone call?:   Patient would prefer a phone call   Okay to leave a detailed message?: Yes at Cell number on file:    Telephone Information:   Mobile [phone]       Call taken on 3/29/2024 at 2:38 AM by Brooklynn Bloom

## 2024-03-31 ENCOUNTER — HOSPITAL ENCOUNTER (OUTPATIENT)
Dept: MRI IMAGING | Facility: CLINIC | Age: 29
Discharge: HOME | End: 2024-03-31
Attending: NURSE PRACTITIONER | Admitting: NURSE PRACTITIONER
Payer: COMMERCIAL

## 2024-03-31 DIAGNOSIS — R20.0 NUMBNESS AND TINGLING OF UPPER EXTREMITY: ICD-10-CM

## 2024-03-31 DIAGNOSIS — R51.9 NONINTRACTABLE EPISODIC HEADACHE, UNSPECIFIED HEADACHE TYPE: ICD-10-CM

## 2024-03-31 DIAGNOSIS — R20.2 NUMBNESS AND TINGLING OF UPPER EXTREMITY: ICD-10-CM

## 2024-03-31 PROCEDURE — A9585 GADOBUTROL INJECTION: HCPCS | Performed by: NURSE PRACTITIONER

## 2024-03-31 PROCEDURE — 70553 MRI BRAIN STEM W/O & W/DYE: CPT

## 2024-03-31 PROCEDURE — 255N000002 HC RX 255 OP 636: Performed by: NURSE PRACTITIONER

## 2024-03-31 RX ORDER — GADOBUTROL 604.72 MG/ML
10 INJECTION INTRAVENOUS ONCE
Status: COMPLETED | OUTPATIENT
Start: 2024-03-31 | End: 2024-03-31

## 2024-03-31 RX ADMIN — GADOBUTROL 10 ML: 604.72 INJECTION INTRAVENOUS at 14:51

## 2024-04-02 ENCOUNTER — OFFICE VISIT (OUTPATIENT)
Dept: FAMILY MEDICINE | Facility: CLINIC | Age: 29
End: 2024-04-02
Payer: COMMERCIAL

## 2024-04-02 VITALS
HEART RATE: 79 BPM | DIASTOLIC BLOOD PRESSURE: 76 MMHG | SYSTOLIC BLOOD PRESSURE: 110 MMHG | HEIGHT: 74 IN | TEMPERATURE: 97.9 F | BODY MASS INDEX: 35.29 KG/M2 | OXYGEN SATURATION: 94 % | RESPIRATION RATE: 16 BRPM | WEIGHT: 275 LBS

## 2024-04-02 DIAGNOSIS — G43.909 MIGRAINE WITHOUT STATUS MIGRAINOSUS, NOT INTRACTABLE, UNSPECIFIED MIGRAINE TYPE: ICD-10-CM

## 2024-04-02 DIAGNOSIS — R00.2 PALPITATIONS: Primary | ICD-10-CM

## 2024-04-02 PROCEDURE — 99213 OFFICE O/P EST LOW 20 MIN: CPT | Performed by: NURSE PRACTITIONER

## 2024-04-02 PROCEDURE — 93246 EXT ECG>7D<15D RECORDING: CPT | Performed by: NURSE PRACTITIONER

## 2024-04-02 ASSESSMENT — PAIN SCALES - GENERAL: PAINLEVEL: NO PAIN (0)

## 2024-04-02 NOTE — PROGRESS NOTES
"Assessment & Plan     ICD-10-CM    1. Palpitations  R00.2 ZIO PATCH 8-14 DAYS (additional cost to patient)     ZIO PATCH 8-14 DAYS APPLICATION      2. Migraine without status migrainosus, not intractable, unspecified migraine type  G43.909 Adult Neurology  Referral        Given continued episode of palpitations, will get continuous cardiac monitor.  After discussion, elected to send patient to neurology for further workup of headaches/migraines/numbness.  Discussed EMG but will defer to neurology's recommendation    Reviewed ED note x 1, ECG x 1, troponin x 1, CBC x 1, CMP x 1    Subjective     HPI     Patient presents today from ED follow-up.  Still having episodes of palpitations.  Last 2 ECG reassuring.  Self-limiting.    Headaches continue to be a problem.  No benefit with medication yet.  Did have brain MRI showing small cavernous malformation.  Radiology noted no further follow-up on that would be required.  Has been having some tingling episodes in the hands.  Typically occurs along the fifth/fourth finger.  No time of the day that it is worse.  No clear triggers.    Review of Systems - negative except for what's listed in the HPI      Objective    /76 (BP Location: Left arm, Patient Position: Sitting, Cuff Size: Adult Large)   Pulse 79   Temp 97.9  F (36.6  C) (Oral)   Resp 16   Ht 1.867 m (6' 1.5\")   Wt 124.7 kg (275 lb)   SpO2 94%   BMI 35.79 kg/m    Physical Exam   General appearance - alert, well appearing, and in no distress  Mental status - alert, oriented to person, place, and time  Eyes -PERRLA  Neck - no significant adenopathy  Chest - clear to auscultation, no wheezes, rales or rhonchi, symmetric air entry  Heart - normal rate and regular rhythm, S1 and S2 normal, no murmurs noted  Neurological - alert, oriented, normal speech, no focal findings or movement disorder noted, neck supple without rigidity, cranial nerves II through XII intact, motor and sensory grossly normal " bilaterally, normal muscle tone, no tremors, strength 5/5  Extremities - peripheral pulses normal, no peripheral edema  Skin - normal coloration and turgor.    Chuy Kumar, CNP    This note has been dictated using voice recognition software. Any grammatical or context distortions are unintentional and inherent to the software.

## 2024-04-02 NOTE — PATIENT INSTRUCTIONS
Once the patch is completed, cardiology will review and we should get results within a week or so.    I placed that referral to neurology to put all of the tests together and see if that EMG would be worthwhile and to address the migraines/MRI result. Contact info for Rajat is in your paperwork.

## 2024-04-04 DIAGNOSIS — G43.919 INTRACTABLE MIGRAINE WITHOUT STATUS MIGRAINOSUS, UNSPECIFIED MIGRAINE TYPE: ICD-10-CM

## 2024-04-05 NOTE — TELEPHONE ENCOUNTER
Please contact patient.  Got a refill request for his topiramate.  If I remember correctly he did not find much benefit from this so does he want to discontinue?    Chuy Kumar, CNP

## 2024-04-09 RX ORDER — TOPIRAMATE 25 MG/1
50 TABLET, FILM COATED ORAL 2 TIMES DAILY
Qty: 360 TABLET | Refills: 1 | OUTPATIENT
Start: 2024-04-09

## 2024-04-10 ENCOUNTER — TELEPHONE (OUTPATIENT)
Dept: FAMILY MEDICINE | Facility: CLINIC | Age: 29
End: 2024-04-10
Payer: COMMERCIAL

## 2024-04-10 NOTE — TELEPHONE ENCOUNTER
Patient had ziopatch applied on 4/2 in clinic and states it is starting to pull up on the outsides of the adhesive tabs.  The sensors are still intact and on the skin.  Patient is wondering what he can do regarding this.  Advised to try applying some medical tape or could come in to clinic and we could use some better tape/adhesive to help it to stick.      Huddled with provider who agreed he could try taping it down, or if he does not get the full 14 days, he can send that in.  He is at day 8 now.    Left a message with patient to check his MyChart where writer put these recommendations for him to read.    JEFF ZabalaN RN  [facility]

## 2024-04-23 ENCOUNTER — TELEPHONE (OUTPATIENT)
Dept: FAMILY MEDICINE | Facility: CLINIC | Age: 29
End: 2024-04-23

## 2024-04-23 DIAGNOSIS — R07.9 CHEST PAIN, UNSPECIFIED TYPE: Primary | ICD-10-CM

## 2024-04-23 PROCEDURE — 93248 EXT ECG>7D<15D REV&INTERPJ: CPT | Performed by: INTERNAL MEDICINE

## 2024-04-23 NOTE — TELEPHONE ENCOUNTER
Test Results    Contacts         Type Contact Phone/Fax    04/23/2024 12:06 PM CDT Phone (Incoming) Brian Bright (Self) [phone] (M)            Who ordered the test:  Chuy Kumar    Type of test: Lab and Heart monitor    Date of test:  4/2    Where was the test performed:  [facility]    What are your questions/concerns?:  Looking for results    Could we [facility] this information to you [facility] or would you prefer to receive a phone call?:   Patient would prefer a phone call   Okay to leave a detailed message?: Yes at Cell number on file:    Telephone Information:   Mobile [phone]

## 2024-04-24 NOTE — TELEPHONE ENCOUNTER
Results relayed to patient.   He had his appointment with neurology. Neurology said the palpitations could certainly be stress related - even if mental stress is not present, stress can still present with physical symptoms. They said this could also be the cause of his headaches.     Patient reports he does not feel he has mental stress and his anxiety is well controlled.     Patient states he is still experiencing the palpitations- usually once at night, most noticeable while laying down.     Polly Domingo RN  [facility]

## 2024-04-24 NOTE — TELEPHONE ENCOUNTER
----- Message from Chuy Kumar NP sent at 4/24/2024  9:44 AM CDT -----  Please contact patient.    Cardiac monitor looks good.  No major abnormalities were noted.  Palpitation symptoms do not appear to be originating from the heart.  Could be stress.  Could be anxiety.  We could wait to see what neurology has to say.  If he feels like anxiety is under poor control, we could also add an additional medicine to his Lexapro for anxiety management.    Chuy Kumar, CNP

## 2024-04-25 NOTE — TELEPHONE ENCOUNTER
It sounds like we are left with 2 choices.    1:  If he still isn't convinced that stress or anxiety could be the cause of his palpitations then we would consult with cardiology for their opinion.    2: If he is open to trying additional medication for anxiety like 2-3 times a day BuSpar, then we could give that a try.    Chuy Kumar, CNP

## 2024-04-25 NOTE — TELEPHONE ENCOUNTER
Patient Returning Call    Reason for call:  Patient returning call    Information relayed to patient:  Writer tried to transfer to nurse line but not one available. Please advise and call patient back please and thank you.    Patient has additional questions:  No      Could we send this information to you in [employer] or would you prefer to receive a phone call?:   Patient would prefer a phone call   Okay to leave a detailed message?: Yes at Cell number on file:    Telephone Information:   Mobile [phone]

## 2024-04-25 NOTE — TELEPHONE ENCOUNTER
Karlie Godwin RN called Brian on 4/25 and left a message to return the call to the clinic.  If patient calls back, please route to [facility].    TC please route to RN.    JEFF Zabala RN  [facility]

## 2024-04-25 NOTE — TELEPHONE ENCOUNTER
Spoke with patient and relayed provider message. Patient verbalized understanding.     Patient does not want to pursue a cardiology consult at this time since previous cardiac testing coming back normal.     2. Patient states he is open to trying an anxiety medication.   Patient states that mentally he does not feel like he is stressed and feels his anxiety is under control. But if PCP feels the palpitations could be a physical symptom of underlying stress and this medication could help, he would be open to trying that.     Patient also wanted to tell provider that his father also has similar palpitations that are related to acid reflux and is wondering if that should be explored.     Ольга Bagley RN  [facility]

## 2024-04-29 NOTE — TELEPHONE ENCOUNTER
Spoke to patient and relayed message from provider.  Patient verbalized understanding and agrees with plan.    JEFF ZabalaN RN  [facility]

## 2024-04-29 NOTE — TELEPHONE ENCOUNTER
Typically with reflux we see more chest pressure and pains rather than palpitations, but we could do a 2-week trial of omeprazole to see if it helps.  Let me know how things go.    Chuy Kumar, CNP

## 2024-06-11 ENCOUNTER — OFFICE VISIT (OUTPATIENT)
Dept: FAMILY MEDICINE | Facility: CLINIC | Age: 29
End: 2024-06-11
Payer: COMMERCIAL

## 2024-06-11 VITALS
BODY MASS INDEX: 34.56 KG/M2 | RESPIRATION RATE: 16 BRPM | HEIGHT: 74 IN | OXYGEN SATURATION: 94 % | DIASTOLIC BLOOD PRESSURE: 72 MMHG | TEMPERATURE: 98.4 F | SYSTOLIC BLOOD PRESSURE: 110 MMHG | WEIGHT: 269.3 LBS | HEART RATE: 73 BPM
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
## 2024-06-11 DIAGNOSIS — L60.0 INGROWN NAIL OF SECOND TOE OF LEFT FOOT: Primary | ICD-10-CM

## 2024-06-11 PROCEDURE — 99213 OFFICE O/P EST LOW 20 MIN: CPT | Performed by: NURSE PRACTITIONER

## 2024-06-11 ASSESSMENT — PAIN SCALES - GENERAL: PAINLEVEL: NO PAIN (0)
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
## 2024-08-25 ENCOUNTER — HEALTH MAINTENANCE LETTER (OUTPATIENT)
Age: 29
End: 2024-08-25

## 2024-08-27 DIAGNOSIS — F41.1 GENERALIZED ANXIETY DISORDER: ICD-10-CM

## 2024-08-27 RX ORDER — ESCITALOPRAM OXALATE 20 MG/1
20 TABLET ORAL DAILY
Qty: 90 TABLET | Refills: 2 | Status: SHIPPED | OUTPATIENT
Start: 2024-08-27

## 2025-06-11 ENCOUNTER — OFFICE VISIT (OUTPATIENT)
Dept: FAMILY MEDICINE | Facility: CLINIC | Age: 30
End: 2025-06-11
Payer: COMMERCIAL

## 2025-06-11 ENCOUNTER — ANCILLARY PROCEDURE (OUTPATIENT)
Dept: GENERAL RADIOLOGY | Facility: CLINIC | Age: 30
End: 2025-06-11
Attending: NURSE PRACTITIONER
Payer: COMMERCIAL

## 2025-06-11 VITALS
RESPIRATION RATE: 18 BRPM | TEMPERATURE: 98.6 F | HEART RATE: 86 BPM | DIASTOLIC BLOOD PRESSURE: 80 MMHG | HEIGHT: 73 IN | WEIGHT: 270 LBS | OXYGEN SATURATION: 96 % | BODY MASS INDEX: 35.78 KG/M2 | SYSTOLIC BLOOD PRESSURE: 118 MMHG

## 2025-06-11 DIAGNOSIS — R61 DIAPHORESIS: ICD-10-CM

## 2025-06-11 DIAGNOSIS — R61 DIAPHORESIS: Primary | ICD-10-CM

## 2025-06-11 DIAGNOSIS — Z13.1 SCREENING FOR DIABETES MELLITUS: ICD-10-CM

## 2025-06-11 LAB
ERYTHROCYTE [DISTWIDTH] IN BLOOD BY AUTOMATED COUNT: 12.2 % (ref 10–15)
EST. AVERAGE GLUCOSE BLD GHB EST-MCNC: 117 MG/DL
HBA1C MFR BLD: 5.7 % (ref 0–5.6)
HCT VFR BLD AUTO: 46.4 % (ref 40–53)
HGB BLD-MCNC: 15.2 G/DL (ref 13.3–17.7)
MCH RBC QN AUTO: 28.3 PG (ref 26.5–33)
MCHC RBC AUTO-ENTMCNC: 32.8 G/DL (ref 31.5–36.5)
MCV RBC AUTO: 86 FL (ref 78–100)
PLATELET # BLD AUTO: 257 10E3/UL (ref 150–450)
RBC # BLD AUTO: 5.38 10E6/UL (ref 4.4–5.9)
WBC # BLD AUTO: 5.8 10E3/UL (ref 4–11)

## 2025-06-11 PROCEDURE — 83036 HEMOGLOBIN GLYCOSYLATED A1C: CPT | Performed by: NURSE PRACTITIONER

## 2025-06-11 PROCEDURE — 3074F SYST BP LT 130 MM HG: CPT | Performed by: NURSE PRACTITIONER

## 2025-06-11 PROCEDURE — 1126F AMNT PAIN NOTED NONE PRSNT: CPT | Performed by: NURSE PRACTITIONER

## 2025-06-11 PROCEDURE — 99214 OFFICE O/P EST MOD 30 MIN: CPT | Performed by: NURSE PRACTITIONER

## 2025-06-11 PROCEDURE — 3044F HG A1C LEVEL LT 7.0%: CPT | Performed by: NURSE PRACTITIONER

## 2025-06-11 PROCEDURE — 71046 X-RAY EXAM CHEST 2 VIEWS: CPT | Mod: TC | Performed by: RADIOLOGY

## 2025-06-11 PROCEDURE — 80053 COMPREHEN METABOLIC PANEL: CPT | Performed by: NURSE PRACTITIONER

## 2025-06-11 PROCEDURE — 85027 COMPLETE CBC AUTOMATED: CPT | Performed by: NURSE PRACTITIONER

## 2025-06-11 PROCEDURE — 84443 ASSAY THYROID STIM HORMONE: CPT | Performed by: NURSE PRACTITIONER

## 2025-06-11 PROCEDURE — 36415 COLL VENOUS BLD VENIPUNCTURE: CPT | Performed by: NURSE PRACTITIONER

## 2025-06-11 PROCEDURE — 3079F DIAST BP 80-89 MM HG: CPT | Performed by: NURSE PRACTITIONER

## 2025-06-11 ASSESSMENT — PAIN SCALES - GENERAL: PAINLEVEL_OUTOF10: NO PAIN (0)

## 2025-06-11 NOTE — PROGRESS NOTES
"Assessment & Plan     ICD-10-CM    1. Diaphoresis  R61 CBC with platelets     TSH with free T4 reflex     Testosterone, total     XR Chest 2 Views     Comprehensive metabolic panel (BMP + Alb, Alk Phos, ALT, AST, Total. Bili, TP)        Unclear cause.  Very well could be side effect of medication.  Rule out hormone issue.  CXR to rule out more worrisome pathology.  If everything comes out normal, consider switching out selective serotonin reuptake inhibitor, but for now can continue same escitalopram.     Subjective     HPI     Past 2 months getting random hot flashes with sweating.  Lasts 5-10 minutes. Can happen while at rest.  No known enlarged lymph nodes. No anxiety but wonders if underlying panic disorders.  No rashes. No major bruising. Appetite is good. No GI issues.  Breathing is good. No travel overseas.  No TB exposure.  No detention or senior living or homeless shelter history.  Sex drive is baseline.       Review of Systems - negative except for what's listed in the HPI      Objective    /80 (BP Location: Left arm, Patient Position: Sitting, Cuff Size: Adult Large)   Pulse 86   Temp 98.6  F (37  C) (Oral)   Resp 18   Ht 1.854 m (6' 1\")   Wt 122.5 kg (270 lb)   SpO2 96%   BMI 35.62 kg/m    Physical Exam   General appearance - alert, well appearing, and in no distress  Mental status - alert, oriented to person, place, and time  Eyes -Sclera white. PERRLA  Mouth - mucous membranes moist. No oral lesions.  Neck - no significant adenopathy  Lymphatics - no palpable lymphadenopathy  Chest - clear to auscultation, no wheezes, rales or rhonchi, symmetric air entry  Heart - normal rate and regular rhythm, S1 and S2 normal, no murmurs noted  Abdomen - soft, nontender, nondistended, no masses or organomegaly  Neurological - alert, oriented, normal speech, no focal findings or movement disorder noted, neck supple without rigidity, cranial nerves II through XII intact, motor and sensory grossly normal bilaterally, " normal muscle tone, no tremors, strength 5/5  Extremities - peripheral pulses normal, no peripheral edema  Skin - normal coloration and turgor.    Chuy Kumar, CNP    This note has been dictated using voice recognition software. Any grammatical or context distortions are unintentional and inherent to the software.

## 2025-06-11 NOTE — PATIENT INSTRUCTIONS
Updating labs and xrays.    If normal, this may be anxiety or medicine side effects in which we can raghu with meds.

## 2025-06-12 LAB
ALBUMIN SERPL BCG-MCNC: 4.5 G/DL (ref 3.5–5.2)
ALP SERPL-CCNC: 89 U/L (ref 40–150)
ALT SERPL W P-5'-P-CCNC: 43 U/L (ref 0–70)
ANION GAP SERPL CALCULATED.3IONS-SCNC: 11 MMOL/L (ref 7–15)
AST SERPL W P-5'-P-CCNC: 33 U/L (ref 0–45)
BILIRUB SERPL-MCNC: 0.5 MG/DL
BUN SERPL-MCNC: 13.2 MG/DL (ref 6–20)
CALCIUM SERPL-MCNC: 9.7 MG/DL (ref 8.8–10.4)
CHLORIDE SERPL-SCNC: 104 MMOL/L (ref 98–107)
CREAT SERPL-MCNC: 1.05 MG/DL (ref 0.67–1.17)
EGFRCR SERPLBLD CKD-EPI 2021: >90 ML/MIN/1.73M2
GLUCOSE SERPL-MCNC: 97 MG/DL (ref 70–99)
HCO3 SERPL-SCNC: 23 MMOL/L (ref 22–29)
POTASSIUM SERPL-SCNC: 4.3 MMOL/L (ref 3.4–5.3)
PROT SERPL-MCNC: 7.3 G/DL (ref 6.4–8.3)
SODIUM SERPL-SCNC: 138 MMOL/L (ref 135–145)
TSH SERPL DL<=0.005 MIU/L-ACNC: 3.4 UIU/ML (ref 0.3–4.2)

## 2025-06-13 ENCOUNTER — RESULTS FOLLOW-UP (OUTPATIENT)
Dept: FAMILY MEDICINE | Facility: CLINIC | Age: 30
End: 2025-06-13

## 2025-06-14 LAB — TESTOST SERPL-MCNC: 351 NG/DL (ref 240–950)

## 2025-08-20 ENCOUNTER — NURSE TRIAGE (OUTPATIENT)
Dept: NURSING | Facility: CLINIC | Age: 30
End: 2025-08-20
Payer: COMMERCIAL

## (undated) DEVICE — SOL RINGERS LACTATED 1000ML BAG 2B2324X

## (undated) DEVICE — ENDO TROCAR FIRST ENTRY KII FIOS Z-THRD 11X100MM CTF33

## (undated) DEVICE — GOWN XXL 9575

## (undated) DEVICE — CATH CHOLANGIOGRAM 4.5FR TAUT METAL TIP 20018-M55

## (undated) DEVICE — Device

## (undated) DEVICE — SUCTION MANIFOLD NEPTUNE 2 SYS 1 PORT 702-025-000

## (undated) DEVICE — SYR 20ML LL W/O NDL 302830

## (undated) DEVICE — GLOVE UNDER INDICATOR PI SZ 7.0 LF 41670

## (undated) DEVICE — PLATE GROUNDING ADULT W/CORD 9165L

## (undated) DEVICE — DECANTER VIAL 2006S

## (undated) DEVICE — DRAPE C-ARM 60X42" 1013

## (undated) DEVICE — ESU CORD MONOPOLAR 10'  E0510

## (undated) DEVICE — ENDO SHEARS RENEW LAP ENDOCUT SCISSOR TIP 16.5MM 3142

## (undated) DEVICE — GLOVE SURG PI ULTRA TOUCH M SZ 7-1/2 LF

## (undated) DEVICE — SOL WATER IRRIG 1000ML BOTTLE 2F7114

## (undated) DEVICE — SU MONOCRYL+ 4-0 18IN PS2 UND MCP496G

## (undated) DEVICE — TUBING SMOKE EVAC PNEUMOCLEAR HIGH FLOW 0620050250

## (undated) DEVICE — DRSG TELFA 3X4" 1050

## (undated) DEVICE — STOPCOCK 3 WAY 500 PSI M3SNC

## (undated) DEVICE — SYSTEM CLEARIFY VISUALIZATION 21-345

## (undated) DEVICE — ENDO TROCAR SLEEVE KII Z-THREADED 05X100MM CTS02

## (undated) DEVICE — DRSG TEGADERM 2 3/8X2 3/4" 1624W

## (undated) DEVICE — GLOVE BIOGEL PI ULTRATOUCH G SZ 8.0 42180

## (undated) DEVICE — CLIP LIGACLIP LG YELLOW LT400

## (undated) DEVICE — BLADE KNIFE SURG 11 371111

## (undated) DEVICE — BASIN EMESIS STERILE  SSK9005A

## (undated) DEVICE — CATH IV ANGIO INTRO 14GA X 1 3/4" 381467

## (undated) DEVICE — PREP DURAPREP 26ML APL 8630

## (undated) DEVICE — CUSTOM PACK LAP CHOLE SBA5BLCHEA

## (undated) DEVICE — TUBING LAP SUCT/IRRIG STRYKER 250070500

## (undated) DEVICE — CLIP APPLIER ENDO ROTATING 10MM MED/LG ER320

## (undated) DEVICE — ENDO TROCAR FIRST ENTRY KII FIOS Z-THRD 05X100MM CTF03

## (undated) DEVICE — DRSG TEGADERM 2 1/2X 2 3/4"

## (undated) DEVICE — SU VICRYL+ 0 27 UR6 VLT VCP603H

## (undated) DEVICE — ENDO POUCH UNIV RETRIEVAL SYSTEM INZII 10MM CD001

## (undated) RX ORDER — BUPIVACAINE HYDROCHLORIDE 2.5 MG/ML
INJECTION, SOLUTION EPIDURAL; INFILTRATION; INTRACAUDAL
Status: DISPENSED
Start: 2022-01-04

## (undated) RX ORDER — LIDOCAINE HYDROCHLORIDE AND EPINEPHRINE 10; 10 MG/ML; UG/ML
INJECTION, SOLUTION INFILTRATION; PERINEURAL
Status: DISPENSED
Start: 2022-01-04